# Patient Record
Sex: MALE | Race: WHITE | NOT HISPANIC OR LATINO | Employment: PART TIME | URBAN - METROPOLITAN AREA
[De-identification: names, ages, dates, MRNs, and addresses within clinical notes are randomized per-mention and may not be internally consistent; named-entity substitution may affect disease eponyms.]

---
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
## 2019-01-07 ENCOUNTER — TELEPHONE (OUTPATIENT)
Dept: PEDIATRICS | Facility: CLINIC | Age: 19
End: 2019-01-07

## 2019-01-07 DIAGNOSIS — F90.2 ATTENTION DEFICIT HYPERACTIVITY DISORDER (ADHD), COMBINED TYPE: Primary | ICD-10-CM

## 2019-01-07 RX ORDER — METHYLPHENIDATE HYDROCHLORIDE 30 MG/1
30 CAPSULE, EXTENDED RELEASE ORAL 2 TIMES DAILY
Qty: 60 CAPSULE | Refills: 0 | Status: SHIPPED | OUTPATIENT
Start: 2019-01-07 | End: 2019-02-05 | Stop reason: DRUGHIGH

## 2019-01-07 NOTE — TELEPHONE ENCOUNTER
Ronald Chicas, calling to notify that Concerta CR isn't covered by the insurance, will cost about $501 for one month supply. Dad wants to know whether pt can try IR bid which will be around $30 per month.     Please advise. Need to leave the new rx with FD when ready & notify dad at [phone](OK to LM).    Notes from 7/19/18:  Attention deficit hyperactivity disorder (ADHD), combined type  Currently tolerating concerta dose well. No concerns, no heart racing or elevated BPs.   - methylphenidate ER (CONCERTA) 36 MG CR tablet; TAKE 2 DAILY  Dispense: 60 tablet; Refill: 0    methylphenidate ER (CONCERTA) 36 MG CR tablet 60 tablet 0 12/15/2018  --   Sig - Route: Take 2 tablets (72 mg) by mouth every morning - Oral     Felisa RN  Triage Nurse

## 2019-01-07 NOTE — TELEPHONE ENCOUNTER
Ok to try ritalin 30 mg BID. Discuss taking afternoon dose earlier in the afternoon to avoid keeping up at night. Routing to providers in clinic to sign if ok.     Patient due for follow up, MA team to help schedule patient.     Tyson Paulino MD

## 2019-01-08 NOTE — TELEPHONE ENCOUNTER
Rx placed at  area for . Please asst with scheduling appt. See provider not below.  Cristel VARGAS, ROSALIO,ROMEO

## 2019-01-17 ENCOUNTER — TELEPHONE (OUTPATIENT)
Dept: PEDIATRICS | Facility: CLINIC | Age: 19
End: 2019-01-17

## 2019-01-17 DIAGNOSIS — F90.2 ATTENTION DEFICIT HYPERACTIVITY DISORDER (ADHD), COMBINED TYPE: Primary | ICD-10-CM

## 2019-01-17 NOTE — TELEPHONE ENCOUNTER
Reason for Call:  Other prescription    Detailed comments: The pt's father was calling and the rx for the methylphenidate (RITALIN LA) 30 MG 24 hr capsule was picked up today. When the pt's mother went to the pharmacy to fill the rx she was told that the medication wasn't covered and it needed a PA. She paid for the medication out of pocket instead of waiting for the PA to go through. So the pt has 15 days of his medication but the father is wondering if the pt can go back onto the Concerta 36mg ER with the same dosage as before. Because from what he recalls that was covered by insurance.     If possible please refill the new rx for the Concerta ER 36mg and leave the rx at the clinic . Please call the father with any questions and when the rx is ready for . Thank you.     Phone Number Patient can be reached at: Other phone number: [phone]    Best Time: Anytime    Can we leave a detailed message on this number? YES    Call taken on 1/17/2019 at 4:50 PM by Mey Ruiz

## 2019-01-18 ENCOUNTER — TELEPHONE (OUTPATIENT)
Dept: PEDIATRICS | Facility: CLINIC | Age: 19
End: 2019-01-18

## 2019-01-18 DIAGNOSIS — F90.2 ATTENTION DEFICIT HYPERACTIVITY DISORDER (ADHD), COMBINED TYPE: ICD-10-CM

## 2019-01-18 RX ORDER — METHYLPHENIDATE HYDROCHLORIDE 36 MG/1
36 TABLET ORAL EVERY MORNING
Qty: 30 TABLET | Refills: 0 | Status: SHIPPED | OUTPATIENT
Start: 2019-01-18 | End: 2019-02-05 | Stop reason: DRUGHIGH

## 2019-01-18 NOTE — TELEPHONE ENCOUNTER
The father is aware that the rx has been filled. I have walked the rx down to the .   Mey Ruiz on 1/18/2019 at 2:24 PM

## 2019-01-18 NOTE — TELEPHONE ENCOUNTER
Prior Authorization Retail Medication Request    Medication/Dose: methylphenidate (RITALIN LA) 30 MG 24 hr capsule  ICD code (if different than what is on RX):  Attention deficit hyperactivity disorder (ADHD), combined type [F90.2]  Previously Tried and Failed:  N/A  Rationale:  N/A     Insurance Name:  [facility]   Insurance ID:  [id number]      Pharmacy Information (if different than what is on RX)  Name:  [facility]   Phone:  [phone]

## 2019-01-22 DIAGNOSIS — F90.2 ATTENTION DEFICIT HYPERACTIVITY DISORDER (ADHD), COMBINED TYPE: ICD-10-CM

## 2019-01-22 RX ORDER — METHYLPHENIDATE HYDROCHLORIDE 36 MG/1
72 TABLET ORAL EVERY MORNING
Qty: 60 TABLET | Refills: 0 | Status: SHIPPED | OUTPATIENT
Start: 2019-01-22 | End: 2019-02-05

## 2019-01-22 NOTE — TELEPHONE ENCOUNTER
Mey MARIA placed Rx at  on first floor. Parent's notified of new Rx and ready to be picked up.  Chantel Linn MA 1/22/2019 4:35 PM

## 2019-01-22 NOTE — TELEPHONE ENCOUNTER
Patient father calling to report the wrong dose of concerta was reordered for patient.     Reports tried ritalin and then when concerta was ordered was only ordered for 36 mg once daily.  Notes that patient was previously on 36 mg Take 2 daily.    Previous BID order pended for provider to review.    Patient father will  down stairs.  Notes that he is aware will need a PA.    Please review pended order  Esperanza CHONG RN - Triage  [facility]

## 2019-01-23 NOTE — TELEPHONE ENCOUNTER
PA Initiation    Medication: methylphenidate (RITALIN LA) 30 MG 24 hr capsule - INITIATED  Insurance Company: [employer] [place] - Phone [phone] Fax [phone]  Pharmacy Filling the Rx: [email or web address] [address] [place] [address] AT [facility]  Filling Pharmacy Phone: [phone]  Filling Pharmacy Fax:    Start Date: 1/23/2019

## 2019-01-24 NOTE — TELEPHONE ENCOUNTER
PRIOR AUTHORIZATION DENIED    Medication: methylphenidate (RITALIN LA) 30 MG 24 hr capsule - DENIED    Denial Date: 1/24/2019    Denial Rational: Dose being prescribed can be reached with a lower quantity of a higher strength. For example, one 60mg capsule instead of two 30mg capsules. Up to one capsule per day.    Appeal Information:

## 2019-01-25 RX ORDER — METHYLPHENIDATE HYDROCHLORIDE 60 MG/1
1 CAPSULE, EXTENDED RELEASE ORAL DAILY
Qty: 30 CAPSULE | Refills: 0 | Status: SHIPPED | OUTPATIENT
Start: 2019-01-25 | End: 2019-02-05

## 2019-02-04 NOTE — PROGRESS NOTES
SUBJECTIVE:   Chino Jesus is a 18 year old male who presents to clinic today for the following health issues:      Medication Followup of Concerta     Taking Medication as prescribed: yes    Side Effects:  Feels drowsy during the day     Medication Helping Symptoms:  yes     Feels that this has been working well, switched to this due to cost, feels doing ok. Possibly some increased headaches, sleeping ok, mood doing ok and would like to stay on 200 mg of zoloft as feels helps with anxiety. Has been less physically active. Doing some special Olympics sports. No palpitations, no chest pain or shortness of breath. Has been biting fingernails more, anxious about transitioning and wondering about seeing psychology for this as has in the past.    Mole: has been scratching off at times, did look darker before, was slightly white at times.     Problem list and histories reviewed & adjusted, as indicated.  Additional history: as documented    Patient Active Problem List   Diagnosis     Anxiety state     ADHD (attention deficit hyperactivity disorder)     Obsessive-compulsive disorder, unspecified type     Active autistic disorder     Mood disorder (H)     Past Surgical History:   Procedure Laterality Date     CIRCUMCISION N/A 8/5/2015    Procedure: CIRCUMCISION;  Surgeon: Chapin Guzman MD;  Location:  OR       Social History     Tobacco Use     Smoking status: Never Smoker     Smokeless tobacco: Never Used   Substance Use Topics     Alcohol use: No     Family History   Adopted: Yes   Problem Relation Age of Onset     Family History Negative Mother            Reviewed and updated as needed this visit by clinical staff       Reviewed and updated as needed this visit by Provider         ROS:  Constitutional, HEENT, cardiovascular, pulmonary, GI, , musculoskeletal, neuro, skin, endocrine and psych systems are negative, except as otherwise noted.    OBJECTIVE:     /68 (BP Location: Right arm, Cuff  "Size: Adult Regular)   Pulse 94   Temp 99.5  F (37.5  C) (Oral)   Ht 1.6 m (5' 3\")   Wt 63 kg (139 lb)   SpO2 96%   BMI 24.62 kg/m    Body mass index is 24.62 kg/m .  GENERAL: healthy, alert and no distress  EYES: Eyes grossly normal to inspection, PERRL and conjunctivae and sclerae normal  NECK: no adenopathy, no asymmetry, masses, or scars and thyroid normal to palpation  RESP: lungs clear to auscultation - no rales, rhonchi or wheezes  CV: regular rate and rhythm, normal S1 S2, no S3 or S4, no murmur, click or rub, no peripheral edema and peripheral pulses strong  ABDOMEN: soft, nontender, no hepatosplenomegaly, no masses and bowel sounds normal  MS: no gross musculoskeletal defects noted, no edema  SKIN: no suspicious lesions or rashes  NEURO: Normal strength and tone, mentation intact and speech normal  PSYCH: mentation appears normal, affect normal/bright    Diagnostic Test Results:  Results for orders placed or performed in visit on 01/23/18   Strep, Rapid Screen   Result Value Ref Range    Specimen Description Throat     Rapid Strep A Screen       NEGATIVE: No Group A streptococcal antigen detected by immunoassay, await culture report.   Beta strep group A culture   Result Value Ref Range    Specimen Description Throat     Culture Micro No beta hemolytic Streptococcus Group A isolated        ASSESSMENT/PLAN:       ICD-10-CM    1. Attention deficit hyperactivity disorder (ADHD), combined type F90.2 methylphenidate (CONCERTA) 36 MG CR tablet     MENTAL HEALTH REFERRAL  - Child/Adolescent; Outpatient Treatment; Individual/Couples/Family/Group Therapy; Other: [facility] [phone]; We will contact you to schedule the appointment or please call with any questions     DISCONTINUED: methylphenidate (CONCERTA) 36 MG CR tablet     DISCONTINUED: methylphenidate (CONCERTA) 36 MG CR tablet   2. Anxiety F41.9 sertraline (ZOLOFT) 100 MG tablet     MENTAL HEALTH REFERRAL  - Child/Adolescent; " Outpatient Treatment; Individual/Couples/Family/Group Therapy; Other: [facility] [phone]; We will contact you to schedule the appointment or please call with any questions   3. Mood disorder (H) F39 sertraline (ZOLOFT) 100 MG tablet     Patient Instructions   1) They should call to set up with psychology    2) Continue the same dose of the concerta    3) Continue the zoloft at 200 mg per day.    MD Tyson Ruano MD, MD  [facility] [place]

## 2019-02-05 ENCOUNTER — OFFICE VISIT (OUTPATIENT)
Dept: PEDIATRICS | Facility: CLINIC | Age: 19
End: 2019-02-05
Payer: COMMERCIAL

## 2019-02-05 VITALS
HEART RATE: 94 BPM | SYSTOLIC BLOOD PRESSURE: 120 MMHG | WEIGHT: 139 LBS | TEMPERATURE: 99.5 F | DIASTOLIC BLOOD PRESSURE: 68 MMHG | OXYGEN SATURATION: 96 % | BODY MASS INDEX: 24.63 KG/M2 | HEIGHT: 63 IN

## 2019-02-05 DIAGNOSIS — F41.9 ANXIETY: ICD-10-CM

## 2019-02-05 DIAGNOSIS — F90.2 ATTENTION DEFICIT HYPERACTIVITY DISORDER (ADHD), COMBINED TYPE: ICD-10-CM

## 2019-02-05 DIAGNOSIS — F39 MOOD DISORDER (H): ICD-10-CM

## 2019-02-05 PROCEDURE — 99214 OFFICE O/P EST MOD 30 MIN: CPT | Performed by: INTERNAL MEDICINE

## 2019-02-05 RX ORDER — SERTRALINE HYDROCHLORIDE 100 MG/1
200 TABLET, FILM COATED ORAL DAILY
Qty: 180 TABLET | Refills: 3 | Status: SHIPPED | OUTPATIENT
Start: 2019-02-05 | End: 2019-12-19

## 2019-02-05 RX ORDER — METHYLPHENIDATE HYDROCHLORIDE 36 MG/1
72 TABLET ORAL EVERY MORNING
Qty: 60 TABLET | Refills: 0 | Status: SHIPPED | OUTPATIENT
Start: 2019-02-05 | End: 2019-02-05

## 2019-02-05 RX ORDER — METHYLPHENIDATE HYDROCHLORIDE 36 MG/1
72 TABLET ORAL EVERY MORNING
Qty: 60 TABLET | Refills: 0 | Status: SHIPPED | OUTPATIENT
Start: 2019-03-07 | End: 2019-02-05

## 2019-02-05 RX ORDER — LANOLIN ALCOHOL/MO/W.PET/CERES
10 CREAM (GRAM) TOPICAL
COMMUNITY

## 2019-02-05 RX ORDER — METHYLPHENIDATE HYDROCHLORIDE 36 MG/1
72 TABLET ORAL EVERY MORNING
Qty: 60 TABLET | Refills: 0 | Status: SHIPPED | OUTPATIENT
Start: 2019-04-06 | End: 2019-05-23

## 2019-02-05 ASSESSMENT — MIFFLIN-ST. JEOR: SCORE: 1540.63

## 2019-02-05 NOTE — PATIENT INSTRUCTIONS
1) They should call to set up with psychology    2) Continue the same dose of the concerta    3) Continue the zoloft at 200 mg per day.    Tyson Paulino MD

## 2019-03-15 ENCOUNTER — TRANSFERRED RECORDS (OUTPATIENT)
Dept: HEALTH INFORMATION MANAGEMENT | Facility: CLINIC | Age: 19
End: 2019-03-15

## 2019-05-20 DIAGNOSIS — F90.2 ATTENTION DEFICIT HYPERACTIVITY DISORDER (ADHD), COMBINED TYPE: ICD-10-CM

## 2019-05-20 NOTE — TELEPHONE ENCOUNTER
Patient's mother called wondering if Dr. Paulino would prescribe 3 rx for 3 months of the Concerta (like he did in the last OV the patient had) or if the patient would need an office visit. Mother stated that patient has about 7 days left of the rx. Please call back at [phone].

## 2019-05-20 NOTE — TELEPHONE ENCOUNTER
Scripts can be e-scribe.  No pharmacy pended.  Left message to return call.  Need to know what pharmacy to send scripts to  Esperanza CHONG RN - Triage  [facility]

## 2019-05-20 NOTE — TELEPHONE ENCOUNTER
Return call from mom - pended pharmacy - discuss ed 72 hours for processing refills please and new electronic process for controlled substances - agrees with plan - consent on file

## 2019-05-21 NOTE — TELEPHONE ENCOUNTER
Routing refill request to provider for review/approval because:  Drug not on the [facility] refill protocol   Agueda Boyce RN

## 2019-05-23 RX ORDER — METHYLPHENIDATE HYDROCHLORIDE 36 MG/1
72 TABLET ORAL EVERY MORNING
Qty: 60 TABLET | Refills: 0 | Status: SHIPPED | OUTPATIENT
Start: 2019-05-23 | End: 2019-06-25

## 2019-06-24 ENCOUNTER — TELEPHONE (OUTPATIENT)
Dept: PEDIATRICS | Facility: CLINIC | Age: 19
End: 2019-06-24

## 2019-06-24 DIAGNOSIS — F90.2 ATTENTION DEFICIT HYPERACTIVITY DISORDER (ADHD), COMBINED TYPE: ICD-10-CM

## 2019-06-24 NOTE — TELEPHONE ENCOUNTER
Requested Prescriptions   Pending Prescriptions Disp Refills     methylphenidate (CONCERTA) 36 MG CR tablet        Last Written Prescription Date:  5/23/2019  Last Fill Quantity: 60,   # refills: 0  Last Office Visit: 2/5/2019  Future Office visit:       Routing refill request to provider for review/approval because:  Drug not on the [facility], P or [facility] refill protocol or controlled substance   60 tablet 0     Sig: Take 2 tablets (72 mg) by mouth every morning TAKE 2 DAILY       There is no refill protocol information for this order

## 2019-06-24 NOTE — TELEPHONE ENCOUNTER
RX [place] ([place] reviewed:  reviewed- no concerns    Esperanza CHONG RN - Triage  [facility]

## 2019-06-25 RX ORDER — METHYLPHENIDATE HYDROCHLORIDE 36 MG/1
72 TABLET ORAL EVERY MORNING
Qty: 60 TABLET | Refills: 0 | Status: SHIPPED | OUTPATIENT
Start: 2019-06-25 | End: 2019-07-23

## 2019-07-03 ENCOUNTER — OFFICE VISIT (OUTPATIENT)
Dept: PEDIATRICS | Facility: CLINIC | Age: 19
End: 2019-07-03
Payer: COMMERCIAL

## 2019-07-03 VITALS
OXYGEN SATURATION: 97 % | TEMPERATURE: 97.5 F | HEART RATE: 60 BPM | WEIGHT: 139 LBS | BODY MASS INDEX: 24.62 KG/M2 | DIASTOLIC BLOOD PRESSURE: 78 MMHG | SYSTOLIC BLOOD PRESSURE: 102 MMHG

## 2019-07-03 DIAGNOSIS — S41.151A DOG BITE OF ARM, RIGHT, INITIAL ENCOUNTER: Primary | ICD-10-CM
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
## 2019-07-03 PROCEDURE — 99213 OFFICE O/P EST LOW 20 MIN: CPT | Mod: 25 | Performed by: NURSE PRACTITIONER

## 2019-07-03 PROCEDURE — 90714 TD VACC NO PRESV 7 YRS+ IM: CPT | Performed by: NURSE PRACTITIONER

## 2019-07-03 PROCEDURE — 90471 IMMUNIZATION ADMIN: CPT | Performed by: NURSE PRACTITIONER

## 2019-07-03 NOTE — PROGRESS NOTES
"Subjective     Chino Jesus is a 18 year old male who presents to clinic today for the following health issues:    HPI   Concern - Dog bite on right forearm  Onset: Yesterday at 7PM.    Description:   Cut in right forearm.     Intensity: mild    Progression of Symptoms:  improving    Accompanying Signs & Symptoms:  NA    Previous history of similar problem:   NA    Precipitating factors:   Worsened by: Arm movement.     Alleviating factors:  Improved by:     Therapies Tried and outcome: Cleaned with alcohol and applied neosporin.     Family dog- up to date on rabies vaccine (due Oct 2019).  Pt suddenly went up to dog while eating and touched his face, resulting in a bite to the right forearm.  Initially bled \"quite a bit\" but has since resolved and has scabbed.  Slightly painful to move his arm.  Mom did give x2 doses of Augmentin from home.  He otherwise feels well without fever.    Reviewed and updated as needed this visit by Provider  Allergies  Meds       Review of Systems   Otherwise ROS is negative except as stated above.        Objective    /78   Pulse 60   Temp 97.5  F (36.4  C) (Tympanic)   Wt 63 kg (139 lb)   SpO2 97%   BMI 24.62 kg/m    Body mass index is 24.62 kg/m .  Physical Exam   GENERAL: healthy, alert and no distress  MS: no gross musculoskeletal defects noted, no edema  SKIN: x1 puncture wound with scab to upper right forearm, slight localized edema, tenderness, swelling, and light erythema surrounding wound    Diagnostic Test Results:  Labs reviewed in Epic        Assessment & Plan   1. Dog bite of arm, right, initial encounter  Start antibiotics. Will end up having 6 days total of Augmentin since Mom has given him x2 doses previously. Discussed signs/symptoms to watch for and when to return to clinic. Update tetanus vaccine.  - amoxicillin-clavulanate (AUGMENTIN) 875-125 MG tablet; Take 1 tablet by mouth 2 times daily for 5 days  Dispense: 10 tablet; Refill: 0  - TD PRESERV " FREE, IM (7+ YRS)  - ADMIN 1st VACCINE     See Patient Instructions    Return in about 2 days (around 7/5/2019) for Follow-up if symptoms do not improve or worsen.    Sharla Roberts NP  [facility]    Screening Questionnaire for Adult Immunization    Are you sick today?   No   Do you have allergies to medications, food, a vaccine component or latex?   No   Have you ever had a serious reaction after receiving a vaccination?   No   Do you have a long-term health problem with heart disease, lung disease, asthma, kidney disease, metabolic disease (e.g. diabetes), anemia, or other blood disorder?   No   Do you have cancer, leukemia, HIV/AIDS, or any other immune system problem?   No   In the past 3 months, have you taken medications that affect  your immune system, such as prednisone, other steroids, or anticancer drugs; drugs for the treatment of rheumatoid arthritis, Crohn s disease, or psoriasis; or have you had radiation treatments?   No   Have you had a seizure, or a brain or other nervous system problem?   No   During the past year, have you received a transfusion of blood or blood     products, or been given immune (gamma) globulin or antiviral drug?   No   For women: Are you pregnant or is there a chance you could become        pregnant during the next month?   No   Have you received any vaccinations in the past 4 weeks?   No     Immunization questionnaire answers were all negative.      Per orders of Sharla Roberts NP, injection of TD given by Rupa Alicia. Patient instructed to remain in clinic for 15 minutes afterwards, and to report any adverse reaction to me immediately.       Screening performed by Rupa Alicia on 7/3/2019 at 6:10 PM.

## 2019-07-03 NOTE — PATIENT INSTRUCTIONS
Patient Education     Animal Bite (General)  An animal bite can cause a wound deep enough to break the skin. In such cases, the wound is cleaned and then sometimes closed. If the wound is closed, it may not be closed completely. This is so that fluid can drain if the wound becomes infected. In addition to wound care, a tetanus shot may be given, if needed.    Home care    Care for the wound as directed. If a dressing was applied to the wound, be sure to change it as directed.    Wash your hands well with soap and warm water before and after caring for the wound. This helps lower the risk of infection.    If the wound bleeds, place a clean, soft cloth on the wound. Then firmly apply pressure until the bleeding stops. This may take up to 5 minutes. Do not release the pressure and look at the wound during this time.    Most skin wounds heal within 10 days. But an infection can occur even with proper treatment. So be sure to watch the wound for signs of infection (see below). Check the wound as often as directed by your healthcare provider.    Antibiotics may be prescribed. These help prevent or treat infection. If you re given antibiotics, take them as directed. Also be sure to complete the medicines.  Rabies prevention  Rabies is a virus that can be carried in certain animals. These can include domestic animals such as dogs and cats. Wild animals such as skunks, raccoons, foxes, and bats can also carry rabies. Pets fully vaccinated against rabies (2 shots) are at very low risk of infection. But because human rabies is almost always fatal, any biting pet should be confined for 10 days as an extra precaution. In general, if there is a risk for rabies, the following steps may need to be taken:    If someone s pet dog or cat has bitten you, it should be kept in a secure area for the next 10 days to watch for signs of illness. (If the pet owner won t allow this, contact your local animal control center.) If the dog or cat  becomes ill or dies during that time, contact your local animal control center at once so the animal may be tested for rabies. If the pet stays healthy for the next 10 days, there is no danger of rabies in the animal or you.    If a stray pet bit you, contact your local animal control center. They can give information on capture, quarantine, and animal rabies testing.    If you can t find the animal that bit you in the next 2 days, and if rabies exists in your region, you may need to receive the rabies vaccine series. Call your healthcare provider right away. Or return to the emergency department promptly.    All animal bites should be reported to the local animal control center. If you were not given a form to fill out, you can report this yourself.  Follow-up care  Follow up with your healthcare provider, or as directed.  When to seek medical advice  Call your healthcare provider right away if any of these occur:    Signs of infection:  ? Spreading redness or warmth from the wound  ? Increased pain or swelling  ? Fever of 100.4 F (38 C) or higher, or as directed by your healthcare provider  ? Colored fluid or pus draining from the wound    Signs of rabies infection:  ? Headache  ? Confusion  ? Strange behavior  ? Increased salivating and drooling  ? Seizure    Decreased ability to move any body part near the bite area    Bleeding that can't be stopped after 5 minutes of firm pressure  Date Last Reviewed: 3/1/2017    0609-8536 [employer]. [address] [place] [address]. All rights reserved. This information is not intended as a substitute for professional medical care. Always follow your healthcare professional's instructions.

## 2019-07-09 ENCOUNTER — OFFICE VISIT (OUTPATIENT)
Dept: URGENT CARE | Facility: URGENT CARE | Age: 19
End: 2019-07-09
Payer: COMMERCIAL

## 2019-07-09 VITALS
DIASTOLIC BLOOD PRESSURE: 72 MMHG | TEMPERATURE: 97.8 F | OXYGEN SATURATION: 98 % | WEIGHT: 138 LBS | HEART RATE: 74 BPM | RESPIRATION RATE: 18 BRPM | BODY MASS INDEX: 24.45 KG/M2 | SYSTOLIC BLOOD PRESSURE: 104 MMHG

## 2019-07-09 DIAGNOSIS — K12.0 CANKER SORE: Primary | ICD-10-CM

## 2019-07-09 PROCEDURE — 99213 OFFICE O/P EST LOW 20 MIN: CPT | Performed by: PHYSICIAN ASSISTANT

## 2019-07-09 NOTE — PROGRESS NOTES
SUBJECTIVE:  Chino Jesus is a 18 year old male who presents to the clinic today for a pain in mouth.  Onset of rash was 1 day(s) ago.   Rash is sudden onset.  Location of the rash: mouth.  Quality/symptoms of rash: painful   Symptoms are moderate and rash seems to be stable.  Previous history of a similar rash? No  Recent exposure history: none known    Associated symptoms include: nothing.    Past Medical History:   Diagnosis Date     ADHD (attention deficit hyperactivity disorder)      Anxiety      OCD (obsessive compulsive disorder)      PDD (pervasive developmental disorder)      Short stature      Current Outpatient Medications   Medication Sig Dispense Refill     melatonin 3 MG tablet Take 3 mg by mouth nightly as needed for sleep (patient takes two tablets)       methylphenidate (CONCERTA) 36 MG CR tablet Take 2 tablets (72 mg) by mouth every morning TAKE 2 DAILY 60 tablet 0     sertraline (ZOLOFT) 100 MG tablet Take 2 tablets (200 mg) by mouth daily 180 tablet 3     Social History     Tobacco Use     Smoking status: Never Smoker     Smokeless tobacco: Never Used   Substance Use Topics     Alcohol use: No       ROS:  10 point ROS negative except as listed above      EXAM:   /72   Pulse 74   Temp 97.8  F (36.6  C)   Resp 18   Wt 62.6 kg (138 lb)   SpO2 98%   BMI 24.45 kg/m    GENERAL: alert, no acute distress.  SKIN: canker sore at upper jaw angle  EYES: conjunctiva clear  RESP: no labored breathing  NEURO: Normal strength and tone, sensory exam grossly normal,  normal speech and mentation    ASSESSMENT:  (K12.0) Canker sore  (primary encounter diagnosis)  Plan:     Patient Instructions   Dry and paint on orajel      Patient Education     Canker Sore    A canker sore (also called an aphthous ulcer) is a painful sore on the lining of the mouth. It is most painful during the first few days, and it lasts about 7 to 14 days before going away.  Causes  Canker sores are not cold sores or fever  blisters. They are not contagious, so they are not spread by contact. The exact cause of canker sores is not clear, but there are a number of things that can trigger them in different people.    Mild injury, such as biting the inside of the mouth, lip, or cheek, or dental procedures    Stress    Poor diet, or lack of certain nutrients, including B vitamins and iron    Foods that can irritate the mouth, including tomatoes, citrus fruits, and some nuts (foods that are acidic or contain bitter substances called tannins)    Irritating chemicals, such as those in some toothpastes and mouthwashes    Certain chronic illnesses  Symptoms  Canker sores are found on the lining of the mouth. They can be inside the cheeks or lips, on the roof of the mouth, at the base of the gums, on the tongue, or in the back of the throat. Canker sores typically have these characteristics:    Small, flat (not raised) sores    Can be white or yellowish bumps that are red around the edges or have a red halo    Usually small in size, roundish, and in groups    Accompanied by pain or burning  Canker sores don't leave a scar. But they usually come back.  Home care  The goals of canker sore treatment are to decrease the pain, speed healing, and prevent sores from coming back. No single treatment works for everyone. Try a number of methods to see what works best.  General care    You may find that soft, easy-to-chew foods cause less pain. Use a straw to direct liquids away from the sore.    Use a soft-bristle toothbrush, and brush your teeth gently.    Don t eat acidic, salty, or spicy foods.    Don t eat crusty or crunchy foods such as [place] bread or potato chips. These kinds of foods can hurt the inside of your mouth, or scrape your existing canker sores.  Medicines  You can try over-the-counter medicines that cover the sores and numb them. This protects the sores while they heal and helps reduce pain.  Homemade rinses and solutions  You can use  these solutions as mouth rinses. Spit them out after using them. You can also dab them on the sores. You can repeat these treatments as often as needed.    Rinse your mouth with saltwater.    Mix equal amounts of hydrogen peroxide and water. You can use this as a mouthwash or dab it on spots with a cotton swab. You can also add sodium bicarbonate to this to make a paste, and then dab it on spots.  Follow-up care  Follow up with your healthcare provider, or as advised.    If a culture was done, you will be notified if the treatment needs to be changed. You can call as directed for the results.  Call 911  Call 911 if any of these occur:    Trouble breathing    Inability to swallow    Extreme drowsiness or trouble waking up    Fainting or loss of consciousness    Rapid heart rate    Seizure    Stiff neck  When to seek medical advice  Call your healthcare provider right away if any of these occur:    You have a fever of 100.4 F (38 C) or higher, or as directed by your provider    You are pregnant    You just had surgery or another medical procedure, or you were just discharged from the hospital    It's too painful to eat or swallow  Date Last Reviewed: 10/1/2017    4131-4407 [employer]. [address] [place] [address], [place] [address]. All rights reserved. This information is not intended as a substitute for professional medical care. Always follow your healthcare professional's instructions.

## 2019-07-09 NOTE — PATIENT INSTRUCTIONS
Dry and paint on orajel      Patient Education     Canker Sore    A canker sore (also called an aphthous ulcer) is a painful sore on the lining of the mouth. It is most painful during the first few days, and it lasts about 7 to 14 days before going away.  Causes  Canker sores are not cold sores or fever blisters. They are not contagious, so they are not spread by contact. The exact cause of canker sores is not clear, but there are a number of things that can trigger them in different people.    Mild injury, such as biting the inside of the mouth, lip, or cheek, or dental procedures    Stress    Poor diet, or lack of certain nutrients, including B vitamins and iron    Foods that can irritate the mouth, including tomatoes, citrus fruits, and some nuts (foods that are acidic or contain bitter substances called tannins)    Irritating chemicals, such as those in some toothpastes and mouthwashes    Certain chronic illnesses  Symptoms  Canker sores are found on the lining of the mouth. They can be inside the cheeks or lips, on the roof of the mouth, at the base of the gums, on the tongue, or in the back of the throat. Canker sores typically have these characteristics:    Small, flat (not raised) sores    Can be white or yellowish bumps that are red around the edges or have a red halo    Usually small in size, roundish, and in groups    Accompanied by pain or burning  Canker sores don't leave a scar. But they usually come back.  Home care  The goals of canker sore treatment are to decrease the pain, speed healing, and prevent sores from coming back. No single treatment works for everyone. Try a number of methods to see what works best.  General care    You may find that soft, easy-to-chew foods cause less pain. Use a straw to direct liquids away from the sore.    Use a soft-bristle toothbrush, and brush your teeth gently.    Don t eat acidic, salty, or spicy foods.    Don t eat crusty or crunchy foods such as [place] bread  or potato chips. These kinds of foods can hurt the inside of your mouth, or scrape your existing canker sores.  Medicines  You can try over-the-counter medicines that cover the sores and numb them. This protects the sores while they heal and helps reduce pain.  Homemade rinses and solutions  You can use these solutions as mouth rinses. Spit them out after using them. You can also dab them on the sores. You can repeat these treatments as often as needed.    Rinse your mouth with saltwater.    Mix equal amounts of hydrogen peroxide and water. You can use this as a mouthwash or dab it on spots with a cotton swab. You can also add sodium bicarbonate to this to make a paste, and then dab it on spots.  Follow-up care  Follow up with your healthcare provider, or as advised.    If a culture was done, you will be notified if the treatment needs to be changed. You can call as directed for the results.  Call 911  Call 911 if any of these occur:    Trouble breathing    Inability to swallow    Extreme drowsiness or trouble waking up    Fainting or loss of consciousness    Rapid heart rate    Seizure    Stiff neck  When to seek medical advice  Call your healthcare provider right away if any of these occur:    You have a fever of 100.4 F (38 C) or higher, or as directed by your provider    You are pregnant    You just had surgery or another medical procedure, or you were just discharged from the hospital    It's too painful to eat or swallow  Date Last Reviewed: 10/1/2017    5484-6575 [employer]. [address] [place] [address], [place] [address]. All rights reserved. This information is not intended as a substitute for professional medical care. Always follow your healthcare professional's instructions.

## 2019-07-23 DIAGNOSIS — F90.2 ATTENTION DEFICIT HYPERACTIVITY DISORDER (ADHD), COMBINED TYPE: ICD-10-CM

## 2019-07-23 RX ORDER — METHYLPHENIDATE HYDROCHLORIDE 36 MG/1
72 TABLET ORAL EVERY MORNING
Qty: 60 TABLET | Refills: 0 | Status: CANCELLED | OUTPATIENT
Start: 2019-07-23

## 2019-07-23 RX ORDER — METHYLPHENIDATE HYDROCHLORIDE 36 MG/1
72 TABLET ORAL EVERY MORNING
Qty: 60 TABLET | Refills: 0 | Status: SHIPPED | OUTPATIENT
Start: 2019-07-23 | End: 2019-12-19

## 2019-07-23 NOTE — TELEPHONE ENCOUNTER
Requesting refill of Concerta for Chino.  [facility] still [facility].  Please call Mom, Ani Mcqueen, guardian also, at [phone], with questions and/or when complete.  Pt has appt 8/2/2019 with Dr Paulino.  Thank you.  jaun

## 2019-07-23 NOTE — TELEPHONE ENCOUNTER
Requesting refill of Concerta for Chino.  Ph[facility] still [facility].  Please call Mom, Ani Mcqueen, guardian also, at [phone], with questions and/or when complete.  Pt has appt 8/2/2019 with Dr Paulino.  Thank you.  jaun

## 2019-07-23 NOTE — TELEPHONE ENCOUNTER
Refill request for the following:    methylphenidate (CONCERTA) 36 MG CR tablets  Take two tabs every morning    RX monitoring [place] ([place]) reviewed:  reviewed- no concerns    [place]:  [email or web address]    Viktoriya Craig RN BSN   [facility]

## 2019-08-02 ENCOUNTER — OFFICE VISIT (OUTPATIENT)
Dept: PEDIATRICS | Facility: CLINIC | Age: 19
End: 2019-08-02
Payer: COMMERCIAL

## 2019-08-02 VITALS
SYSTOLIC BLOOD PRESSURE: 124 MMHG | TEMPERATURE: 98.7 F | DIASTOLIC BLOOD PRESSURE: 76 MMHG | HEIGHT: 63 IN | WEIGHT: 141.3 LBS | BODY MASS INDEX: 25.04 KG/M2 | OXYGEN SATURATION: 100 % | HEART RATE: 78 BPM

## 2019-08-02 DIAGNOSIS — F90.2 ATTENTION DEFICIT HYPERACTIVITY DISORDER (ADHD), COMBINED TYPE: Primary | ICD-10-CM

## 2019-08-02 PROCEDURE — 99214 OFFICE O/P EST MOD 30 MIN: CPT | Performed by: INTERNAL MEDICINE

## 2019-08-02 RX ORDER — METHYLPHENIDATE HYDROCHLORIDE 54 MG/1
54 TABLET ORAL DAILY
Qty: 30 TABLET | Refills: 0 | Status: SHIPPED | OUTPATIENT
Start: 2019-08-02 | End: 2019-09-27

## 2019-08-02 RX ORDER — METHYLPHENIDATE HYDROCHLORIDE 54 MG/1
54 TABLET ORAL DAILY
Qty: 30 TABLET | Refills: 0 | Status: SHIPPED | OUTPATIENT
Start: 2019-10-03 | End: 2019-09-27

## 2019-08-02 RX ORDER — METHYLPHENIDATE HYDROCHLORIDE 54 MG/1
54 TABLET ORAL DAILY
Qty: 30 TABLET | Refills: 0 | Status: SHIPPED | OUTPATIENT
Start: 2019-09-02 | End: 2019-09-27

## 2019-08-02 ASSESSMENT — ANXIETY QUESTIONNAIRES
GAD7 TOTAL SCORE: 2
6. BECOMING EASILY ANNOYED OR IRRITABLE: NOT AT ALL
2. NOT BEING ABLE TO STOP OR CONTROL WORRYING: SEVERAL DAYS
5. BEING SO RESTLESS THAT IT IS HARD TO SIT STILL: NOT AT ALL
1. FEELING NERVOUS, ANXIOUS, OR ON EDGE: NOT AT ALL
7. FEELING AFRAID AS IF SOMETHING AWFUL MIGHT HAPPEN: NOT AT ALL
3. WORRYING TOO MUCH ABOUT DIFFERENT THINGS: NOT AT ALL
IF YOU CHECKED OFF ANY PROBLEMS ON THIS QUESTIONNAIRE, HOW DIFFICULT HAVE THESE PROBLEMS MADE IT FOR YOU TO DO YOUR WORK, TAKE CARE OF THINGS AT HOME, OR GET ALONG WITH OTHER PEOPLE: SOMEWHAT DIFFICULT

## 2019-08-02 ASSESSMENT — MIFFLIN-ST. JEOR: SCORE: 1551.06

## 2019-08-02 ASSESSMENT — PATIENT HEALTH QUESTIONNAIRE - PHQ9
SUM OF ALL RESPONSES TO PHQ QUESTIONS 1-9: 2
5. POOR APPETITE OR OVEREATING: SEVERAL DAYS

## 2019-08-02 NOTE — PROGRESS NOTES
Subjective     Chino Jesus is a 18 year old male who presents to clinic today for the following health issues:    HPI     Anxiety Follow-Up    How are you doing with your anxiety since your last visit? No change    Are you having other symptoms that might be associated with anxiety? No    Have you had a significant life event? No     Are you feeling depressed? No    Do you have any concerns with your use of alcohol or other drugs? No    Social History     Tobacco Use     Smoking status: Never Smoker     Smokeless tobacco: Never Used   Substance Use Topics     Alcohol use: No     Drug use: No     Feels that getting more fatigued on the concerta, on lower doses had   - was on 54 mg in the past, and felt that this could be an option to try.   - can get symptoms of increased volume of voice and issues with concentration  - has been having these symptoms ongoing at the 72 mg and does not like.   - sleep has been fairly good recently  - feels that anxiety is doing well on the sertraline   - no chest pain or shortness of breath or headaches  - notes clear correlation between higher dose and fatigue.     - adderall in the past with gazed over type symptoms.     No flowsheet data found.  No flowsheet data found.  No flowsheet data found.    Feels that zoloft is working well at current dosing. No Si or HI, working well for anxiety.      Amount of exercise or physical activity: 4-5 days/week for an average of 45-60 minutes    Problems taking medications regularly: No    Medication side effects: Patient states feeling tired when taking Concerta     Diet: regular (no restrictions)          Patient Active Problem List   Diagnosis     Anxiety state     ADHD (attention deficit hyperactivity disorder)     Obsessive-compulsive disorder, unspecified type     Active autistic disorder     Mood disorder (H)     Past Surgical History:   Procedure Laterality Date     CIRCUMCISION N/A 8/5/2015    Procedure: CIRCUMCISION;  Surgeon:  "Chapin Guzman MD;  Location:  OR       Social History     Tobacco Use     Smoking status: Never Smoker     Smokeless tobacco: Never Used   Substance Use Topics     Alcohol use: No     Family History   Adopted: Yes   Problem Relation Age of Onset     Family History Negative Mother              Reviewed and updated as needed this visit by Provider         Review of Systems   ROS COMP: Constitutional, HEENT, cardiovascular, pulmonary, GI, , musculoskeletal, neuro, skin, endocrine and psych systems are negative, except as otherwise noted.      Objective    /76 (BP Location: Right arm, Patient Position: Sitting, Cuff Size: Adult Regular)   Pulse 78   Temp 98.7  F (37.1  C) (Oral)   Ht 1.6 m (5' 3\")   Wt 64.1 kg (141 lb 4.8 oz)   SpO2 100%   BMI 25.03 kg/m    Body mass index is 25.03 kg/m .  Physical Exam   GENERAL: healthy, alert and no distress  EYES: Eyes grossly normal to inspection, PERRL and conjunctivae and sclerae normal  NECK: no adenopathy, no asymmetry, masses, or scars and thyroid normal to palpation  RESP: lungs clear to auscultation - no rales, rhonchi or wheezes  CV: regular rate and rhythm, normal S1 S2, no S3 or S4, no murmur, click or rub, no peripheral edema and peripheral pulses strong  ABDOMEN: soft, nontender, no hepatosplenomegaly, no masses and bowel sounds normal  MS: no gross musculoskeletal defects noted, no edema  PSYCH: mentation at baseline, normal affect    Diagnostic Test Results:  Labs reviewed in Epic        Assessment & Plan       ICD-10-CM    1. Attention deficit hyperactivity disorder (ADHD), combined type F90.2 methylphenidate (CONCERTA) 54 MG CR tablet     methylphenidate (CONCERTA) 54 MG CR tablet     methylphenidate (CONCERTA) 54 MG CR tablet        BMI:   Estimated body mass index is 25.03 kg/m  as calculated from the following:    Height as of this encounter: 1.6 m (5' 3\").    Weight as of this encounter: 64.1 kg (141 lb 4.8 oz).       Patient Instructions "   1) Lets try the lower dose of the concerta at 54 mg per day    2) We can try other options if this is not helping we could consider vyvanse as next option    3) If we would decide to decrease the zoloft, would go down to 175 mg then several months later go down to 200 mg.    Tyson Paulino MD            See Patient Instructions    Return in about 6 months (around 2/2/2020).    Tyson Paulino MD  [facility]

## 2019-08-02 NOTE — PATIENT INSTRUCTIONS
1) Lets try the lower dose of the concerta at 54 mg per day    2) We can try other options if this is not helping we could consider vyvanse as next option    3) If we would decide to decrease the zoloft, would go down to 175 mg then several months later go down to 200 mg.    Tyson Paulino MD

## 2019-08-03 ASSESSMENT — ANXIETY QUESTIONNAIRES: GAD7 TOTAL SCORE: 2

## 2019-08-27 DIAGNOSIS — F90.2 ATTENTION DEFICIT HYPERACTIVITY DISORDER (ADHD), COMBINED TYPE: ICD-10-CM

## 2019-08-27 RX ORDER — METHYLPHENIDATE HYDROCHLORIDE 54 MG/1
54 TABLET ORAL DAILY
Qty: 30 TABLET | Refills: 0 | Status: CANCELLED | OUTPATIENT
Start: 2019-08-27

## 2019-08-27 NOTE — TELEPHONE ENCOUNTER
Requested Prescriptions   Pending Prescriptions Disp Refills     methylphenidate (CONCERTA) 54 MG CR tablet        Last Written Prescription Date:  7/23/2019  Last Fill Quantity: 60,   # refills: 0  Last Office Visit: 8/2/2019  Future Office visit:       Routing refill request to provider for review/approval because:  Drug not on the [facility], P or [facility] refill protocol or controlled substance   30 tablet 0     Sig: Take 1 tablet (54 mg) by mouth daily       There is no refill protocol information for this order

## 2019-08-30 NOTE — TELEPHONE ENCOUNTER
The pt would like this filled as soon as possible. Thank you.   Mey Ruiz on 8/30/2019 at 12:46 PM

## 2019-08-30 NOTE — TELEPHONE ENCOUNTER
Refill on file at pharmacy. Called patient and mother to inform them (DANILO on file).    Viktoriya Craig RN BSN   [facility]

## 2019-09-24 ENCOUNTER — PRE VISIT (OUTPATIENT)
Dept: PEDIATRICS | Facility: CLINIC | Age: 19
End: 2019-09-24

## 2019-09-24 NOTE — TELEPHONE ENCOUNTER
"Future Appointments   Date Time Provider Department Center   9/27/2019  8:50 AM Tyson Paulino MD [facility]     Appointment Notes for this encounter:   Data Unavailable    Health Maintenance Due   Topic Date Due     PREVENTIVE CARE VISIT  1999     ANNUAL REVIEW OF HM ORDERS  1999     INFLUENZA VACCINE (1) 09/01/2019       Pre-visit planning reviewed items:   Patient preferred phone number: [phone]   Patient contacted.     Actions completed:   Confirmed that the visit type and provider(s) are appropriate for the pt's reason for visit.  Confirmed date/time/location of appointment with patient.  Marked \"Pre-visit Planning Complete\" via Schedule activity.    "

## 2019-09-27 ENCOUNTER — ANCILLARY PROCEDURE (OUTPATIENT)
Dept: GENERAL RADIOLOGY | Facility: CLINIC | Age: 19
End: 2019-09-27
Attending: INTERNAL MEDICINE
Payer: COMMERCIAL

## 2019-09-27 ENCOUNTER — OFFICE VISIT (OUTPATIENT)
Dept: PEDIATRICS | Facility: CLINIC | Age: 19
End: 2019-09-27
Payer: COMMERCIAL

## 2019-09-27 VITALS
WEIGHT: 143 LBS | TEMPERATURE: 98.7 F | SYSTOLIC BLOOD PRESSURE: 122 MMHG | BODY MASS INDEX: 25.34 KG/M2 | DIASTOLIC BLOOD PRESSURE: 72 MMHG | OXYGEN SATURATION: 98 % | HEIGHT: 63 IN | HEART RATE: 85 BPM

## 2019-09-27 DIAGNOSIS — R30.0 DYSURIA: Primary | ICD-10-CM

## 2019-09-27 DIAGNOSIS — R10.84 ABDOMINAL PAIN, GENERALIZED: ICD-10-CM

## 2019-09-27 DIAGNOSIS — F90.2 ATTENTION DEFICIT HYPERACTIVITY DISORDER (ADHD), COMBINED TYPE: ICD-10-CM

## 2019-09-27 DIAGNOSIS — J31.0 CHRONIC RHINITIS: ICD-10-CM

## 2019-09-27 DIAGNOSIS — Z23 NEED FOR PROPHYLACTIC VACCINATION AND INOCULATION AGAINST INFLUENZA: ICD-10-CM

## 2019-09-27 LAB
ALBUMIN UR-MCNC: ABNORMAL MG/DL
APPEARANCE UR: CLEAR
BACTERIA #/AREA URNS HPF: ABNORMAL /HPF
BILIRUB UR QL STRIP: NEGATIVE
COLOR UR AUTO: YELLOW
GLUCOSE UR STRIP-MCNC: NEGATIVE MG/DL
HGB UR QL STRIP: ABNORMAL
KETONES UR STRIP-MCNC: NEGATIVE MG/DL
LEUKOCYTE ESTERASE UR QL STRIP: NEGATIVE
NITRATE UR QL: NEGATIVE
NON-SQ EPI CELLS #/AREA URNS LPF: ABNORMAL /LPF
PH UR STRIP: 6 PH (ref 5–7)
RBC #/AREA URNS AUTO: ABNORMAL /HPF
SOURCE: ABNORMAL
SP GR UR STRIP: 1.02 (ref 1–1.03)
UROBILINOGEN UR STRIP-ACNC: 0.2 EU/DL (ref 0.2–1)
WBC #/AREA URNS AUTO: ABNORMAL /HPF

## 2019-09-27 PROCEDURE — 74018 RADEX ABDOMEN 1 VIEW: CPT

## 2019-09-27 PROCEDURE — 90471 IMMUNIZATION ADMIN: CPT | Performed by: INTERNAL MEDICINE

## 2019-09-27 PROCEDURE — 81001 URINALYSIS AUTO W/SCOPE: CPT | Performed by: INTERNAL MEDICINE

## 2019-09-27 PROCEDURE — 99214 OFFICE O/P EST MOD 30 MIN: CPT | Mod: 25 | Performed by: INTERNAL MEDICINE

## 2019-09-27 PROCEDURE — 90686 IIV4 VACC NO PRSV 0.5 ML IM: CPT | Performed by: INTERNAL MEDICINE

## 2019-09-27 RX ORDER — METHYLPHENIDATE HYDROCHLORIDE 54 MG/1
54 TABLET ORAL DAILY
Qty: 30 TABLET | Refills: 0 | Status: SHIPPED | OUTPATIENT
Start: 2019-09-27 | End: 2020-05-27

## 2019-09-27 RX ORDER — FLUTICASONE PROPIONATE 50 MCG
2 SPRAY, SUSPENSION (ML) NASAL DAILY
Qty: 16 G | Refills: 3 | Status: SHIPPED | OUTPATIENT
Start: 2019-09-27 | End: 2023-09-14

## 2019-09-27 RX ORDER — METHYLPHENIDATE HYDROCHLORIDE 54 MG/1
54 TABLET ORAL DAILY
Qty: 30 TABLET | Refills: 0 | Status: SHIPPED | OUTPATIENT
Start: 2019-10-27 | End: 2020-10-26

## 2019-09-27 RX ORDER — METHYLPHENIDATE HYDROCHLORIDE 54 MG/1
54 TABLET ORAL DAILY
Qty: 30 TABLET | Refills: 0 | Status: SHIPPED | OUTPATIENT
Start: 2019-11-26 | End: 2021-02-03

## 2019-09-27 ASSESSMENT — MIFFLIN-ST. JEOR: SCORE: 1558.77

## 2019-09-27 NOTE — PROGRESS NOTES
Subjective     Chino Jesus is a 18 year old male who presents to clinic today for the following health issues:    HPI     Medication Followup     Taking Medication as prescribed: yes    Side Effects:  None    Medication Helping Symptoms:  yes     ADHD: feels better on the concerta 54 mg per day, feels going better with less side effects.    Abdominal Pain      Duration: several months     Description (location/character/radiation): around belly button, sharp pain        Associated flank pain: None    Intensity:  mild    Accompanying signs and symptoms:        Fever/Chills: no        Gas/Bloating: no        Nausea/vomitting: no        Diarrhea: no        Dysuria or Hematuria: no     History (previous similar pain/trauma/previous testing): none    Precipitating or alleviating factors:       Pain worse with eating/BM/urination: no       Pain relieved by BM: no     Therapies tried and outcome: none    LMP:  not applicable    Noted some blood when wiping    Some pain with urination. Has been there for several years.      Stuffy nose: using saline, and vicks, had been using affrin every day- has been off for several weeks. Used flonase in the past, not using right now.        Patient Active Problem List   Diagnosis     Anxiety state     ADHD (attention deficit hyperactivity disorder)     Obsessive-compulsive disorder, unspecified type     Active autistic disorder     Mood disorder (H)     Past Surgical History:   Procedure Laterality Date     CIRCUMCISION N/A 8/5/2015    Procedure: CIRCUMCISION;  Surgeon: Chapin Guzman MD;  Location:  OR       Social History     Tobacco Use     Smoking status: Never Smoker     Smokeless tobacco: Never Used   Substance Use Topics     Alcohol use: No     Family History   Adopted: Yes   Problem Relation Age of Onset     Family History Negative Mother              Reviewed and updated as needed this visit by Provider         Review of Systems   ROS COMP: Constitutional,  "HEENT, cardiovascular, pulmonary, GI, , musculoskeletal, neuro, skin, endocrine and psych systems are negative, except as otherwise noted.      Objective    /72 (BP Location: Right arm, Patient Position: Sitting, Cuff Size: Adult Regular)   Pulse 85   Temp 98.7  F (37.1  C) (Oral)   Ht 1.6 m (5' 3\")   Wt 64.9 kg (143 lb)   SpO2 98%   BMI 25.33 kg/m    Body mass index is 25.33 kg/m .  Physical Exam   GENERAL: healthy, alert and no distress  NECK: no adenopathy, no asymmetry, masses, or scars and thyroid normal to palpation  RESP: lungs clear to auscultation - no rales, rhonchi or wheezes  CV: regular rate and rhythm, normal S1 S2, no S3 or S4, no murmur, click or rub, no peripheral edema and peripheral pulses strong  ABDOMEN: soft, nontender, without hepatosplenomegaly or masses, bowel sounds normal and possible small deficit at the umbilicus, no obvious mass noted  MS: no gross musculoskeletal defects noted, no edema  SKIN: no suspicious lesions or rashes  NEURO: Normal strength and tone, mentation intact and speech normal    Diagnostic Test Results:  Labs reviewed in Epic  Constipation noted on xray        Assessment & Plan       ICD-10-CM    1. Dysuria R30.0 UA reflex to Microscopic and Culture     Urine Microscopic   2. Attention deficit hyperactivity disorder (ADHD), combined type F90.2 methylphenidate (CONCERTA) 54 MG CR tablet     methylphenidate (CONCERTA) 54 MG CR tablet     methylphenidate (CONCERTA) 54 MG CR tablet   3. Abdominal pain, generalized R10.84 CANCELED: XR KUB   4. Chronic rhinitis J31.0 fluticasone (FLONASE) 50 MCG/ACT nasal spray   5. Need for prophylactic vaccination and inoculation against influenza Z23 INFLUENZA VACCINE IM > 6 MONTHS VALENT IIV4 [26280]     Vaccine Administration, Initial [98979]        BMI:   Estimated body mass index is 25.33 kg/m  as calculated from the following:    Height as of this encounter: 1.6 m (5' 3\").    Weight as of this encounter: 64.9 kg (143 " lb).           Regular exercise    No follow-ups on file.    Tyson Paulino MD  [facility]

## 2019-09-27 NOTE — PATIENT INSTRUCTIONS
- Dr. Bangura is great, Dr. Abbasi, Dr. Gaming, Dr. Benavides (would be great) would all be great fit    1)  Refill medications for 3 months    2) We will check a urine to make sure no concerning changes    3) Xray shows a fair amount of constipation, let's start with miralax   - start with 1 capful per day to start with    - if not helping then would do 4 capfuls for 2 days as a trial to clean things out, then would go back down to 1 capful per day (1-2 months).    4) Try the flonase nasal spray and try to avoid afrin if able    Tyson Paulino MD

## 2019-10-01 ENCOUNTER — TELEPHONE (OUTPATIENT)
Dept: PEDIATRICS | Facility: CLINIC | Age: 19
End: 2019-10-01

## 2019-10-01 NOTE — TELEPHONE ENCOUNTER
Patient's mother called with concerns about UA results and bacteria present. Reassured per Dr. Paulino note that doesn't suspect infection due to WBC and leukocytes normal.    Patient still having discomfort at times when he urinates. Patient also had x-ray of abdomin and showed a stool back-up. Patient was to start taking Miralax and has not started that yet. Explained this could cause some discomfort and patient mother agreed to start this today.    Dr. Benavides: Patient scheduled to see you as possible new PCP in December. Do you want any further urinary testing for patient?    Brianna MENDIOLA RN, BSN

## 2019-10-01 NOTE — TELEPHONE ENCOUNTER
Reviewed Dr. Paulino note and labs.    No further testing at this time.  Agree with Miralax.    If symptoms worsen would need to be seen for further evaluation since I have never met patient.    Rita Benavides MD  Internal Medicine/Pediatrics  [facility]

## 2019-11-04 ENCOUNTER — HEALTH MAINTENANCE LETTER (OUTPATIENT)
Age: 19
End: 2019-11-04

## 2019-11-17 ENCOUNTER — NURSE TRIAGE (OUTPATIENT)
Dept: NURSING | Facility: CLINIC | Age: 19
End: 2019-11-17

## 2019-11-17 ENCOUNTER — HOSPITAL ENCOUNTER (EMERGENCY)
Facility: CLINIC | Age: 19
Discharge: HOME | End: 2019-11-17
Attending: EMERGENCY MEDICINE | Admitting: EMERGENCY MEDICINE
Payer: COMMERCIAL

## 2019-11-17 ENCOUNTER — APPOINTMENT (OUTPATIENT)
Dept: CT IMAGING | Facility: CLINIC | Age: 19
End: 2019-11-17
Attending: EMERGENCY MEDICINE
Payer: COMMERCIAL

## 2019-11-17 VITALS
DIASTOLIC BLOOD PRESSURE: 70 MMHG | HEIGHT: 63 IN | SYSTOLIC BLOOD PRESSURE: 124 MMHG | OXYGEN SATURATION: 99 % | BODY MASS INDEX: 25.69 KG/M2 | RESPIRATION RATE: 16 BRPM | HEART RATE: 84 BPM | WEIGHT: 145 LBS | TEMPERATURE: 98.7 F

## 2019-11-17 DIAGNOSIS — K51.00 PANCOLITIS (H): ICD-10-CM

## 2019-11-17 LAB
ALBUMIN SERPL-MCNC: 3.5 G/DL (ref 3.4–5)
ALBUMIN UR-MCNC: NEGATIVE MG/DL
ALP SERPL-CCNC: 87 U/L (ref 40–150)
ALT SERPL W P-5'-P-CCNC: 40 U/L (ref 0–70)
ANION GAP SERPL CALCULATED.3IONS-SCNC: 3 MMOL/L (ref 3–14)
APPEARANCE UR: CLEAR
AST SERPL W P-5'-P-CCNC: 29 U/L (ref 0–45)
BASOPHILS # BLD AUTO: 0 10E9/L (ref 0–0.2)
BASOPHILS NFR BLD AUTO: 0.3 %
BILIRUB SERPL-MCNC: 0.3 MG/DL (ref 0.2–1.3)
BILIRUB UR QL STRIP: NEGATIVE
BUN SERPL-MCNC: 14 MG/DL (ref 7–30)
C DIFF TOX B STL QL: NEGATIVE
CALCIUM SERPL-MCNC: 8.3 MG/DL (ref 8.5–10.1)
CHLORIDE SERPL-SCNC: 105 MMOL/L (ref 94–109)
CO2 SERPL-SCNC: 28 MMOL/L (ref 20–32)
COLOR UR AUTO: ABNORMAL
CREAT SERPL-MCNC: 0.94 MG/DL (ref 0.66–1.25)
DIFFERENTIAL METHOD BLD: ABNORMAL
EOSINOPHIL # BLD AUTO: 0.1 10E9/L (ref 0–0.7)
EOSINOPHIL NFR BLD AUTO: 2 %
ERYTHROCYTE [DISTWIDTH] IN BLOOD BY AUTOMATED COUNT: 12.5 % (ref 10–15)
GFR SERPL CREATININE-BSD FRML MDRD: >90 ML/MIN/{1.73_M2}
GLUCOSE SERPL-MCNC: 99 MG/DL (ref 70–99)
GLUCOSE UR STRIP-MCNC: NEGATIVE MG/DL
HCT VFR BLD AUTO: 38.1 % (ref 40–53)
HEMOCCULT STL QL: POSITIVE
HGB BLD-MCNC: 13 G/DL (ref 13.3–17.7)
HGB UR QL STRIP: ABNORMAL
IMM GRANULOCYTES # BLD: 0 10E9/L (ref 0–0.4)
IMM GRANULOCYTES NFR BLD: 0.2 %
KETONES UR STRIP-MCNC: NEGATIVE MG/DL
LEUKOCYTE ESTERASE UR QL STRIP: NEGATIVE
LIPASE SERPL-CCNC: 62 U/L (ref 73–393)
LYMPHOCYTES # BLD AUTO: 2.4 10E9/L (ref 0.8–5.3)
LYMPHOCYTES NFR BLD AUTO: 40.5 %
MCH RBC QN AUTO: 27.3 PG (ref 26.5–33)
MCHC RBC AUTO-ENTMCNC: 34.1 G/DL (ref 31.5–36.5)
MCV RBC AUTO: 80 FL (ref 78–100)
MONOCYTES # BLD AUTO: 1 10E9/L (ref 0–1.3)
MONOCYTES NFR BLD AUTO: 16.2 %
NEUTROPHILS # BLD AUTO: 2.4 10E9/L (ref 1.6–8.3)
NEUTROPHILS NFR BLD AUTO: 40.8 %
NITRATE UR QL: NEGATIVE
NRBC # BLD AUTO: 0 10*3/UL
NRBC BLD AUTO-RTO: 0 /100
PH UR STRIP: 6 PH (ref 5–7)
PLATELET # BLD AUTO: 182 10E9/L (ref 150–450)
POTASSIUM SERPL-SCNC: 3.6 MMOL/L (ref 3.4–5.3)
PROT SERPL-MCNC: 7.9 G/DL (ref 6.8–8.8)
RBC # BLD AUTO: 4.77 10E12/L (ref 4.4–5.9)
RBC #/AREA URNS AUTO: <1 /HPF (ref 0–2)
SODIUM SERPL-SCNC: 136 MMOL/L (ref 133–144)
SOURCE: ABNORMAL
SP GR UR STRIP: 1.01 (ref 1–1.03)
SPECIMEN SOURCE: NORMAL
UROBILINOGEN UR STRIP-MCNC: NORMAL MG/DL (ref 0–2)
WBC # BLD AUTO: 5.9 10E9/L (ref 4–11)
WBC #/AREA URNS AUTO: <1 /HPF (ref 0–5)

## 2019-11-17 PROCEDURE — 96375 TX/PRO/DX INJ NEW DRUG ADDON: CPT

## 2019-11-17 PROCEDURE — 82272 OCCULT BLD FECES 1-3 TESTS: CPT | Performed by: EMERGENCY MEDICINE

## 2019-11-17 PROCEDURE — 25000128 H RX IP 250 OP 636: Performed by: EMERGENCY MEDICINE

## 2019-11-17 PROCEDURE — 25000125 ZZHC RX 250: Performed by: EMERGENCY MEDICINE

## 2019-11-17 PROCEDURE — 87506 IADNA-DNA/RNA PROBE TQ 6-11: CPT | Performed by: EMERGENCY MEDICINE

## 2019-11-17 PROCEDURE — 81001 URINALYSIS AUTO W/SCOPE: CPT | Performed by: EMERGENCY MEDICINE

## 2019-11-17 PROCEDURE — 83690 ASSAY OF LIPASE: CPT | Performed by: EMERGENCY MEDICINE

## 2019-11-17 PROCEDURE — 25800030 ZZH RX IP 258 OP 636: Performed by: EMERGENCY MEDICINE

## 2019-11-17 PROCEDURE — 74177 CT ABD & PELVIS W/CONTRAST: CPT

## 2019-11-17 PROCEDURE — 96374 THER/PROPH/DIAG INJ IV PUSH: CPT | Mod: 59

## 2019-11-17 PROCEDURE — 80053 COMPREHEN METABOLIC PANEL: CPT | Performed by: EMERGENCY MEDICINE

## 2019-11-17 PROCEDURE — 96361 HYDRATE IV INFUSION ADD-ON: CPT

## 2019-11-17 PROCEDURE — 99285 EMERGENCY DEPT VISIT HI MDM: CPT | Mod: 25

## 2019-11-17 PROCEDURE — 87493 C DIFF AMPLIFIED PROBE: CPT | Performed by: EMERGENCY MEDICINE

## 2019-11-17 PROCEDURE — 85025 COMPLETE CBC W/AUTO DIFF WBC: CPT | Performed by: EMERGENCY MEDICINE

## 2019-11-17 RX ORDER — MORPHINE SULFATE 4 MG/ML
4 INJECTION, SOLUTION INTRAMUSCULAR; INTRAVENOUS
Status: DISCONTINUED | OUTPATIENT
Start: 2019-11-17 | End: 2019-11-18 | Stop reason: HOSPADM

## 2019-11-17 RX ORDER — ONDANSETRON 2 MG/ML
4 INJECTION INTRAMUSCULAR; INTRAVENOUS EVERY 30 MIN PRN
Status: DISCONTINUED | OUTPATIENT
Start: 2019-11-17 | End: 2019-11-18 | Stop reason: HOSPADM

## 2019-11-17 RX ORDER — SODIUM CHLORIDE 9 MG/ML
1000 INJECTION, SOLUTION INTRAVENOUS CONTINUOUS
Status: DISCONTINUED | OUTPATIENT
Start: 2019-11-17 | End: 2019-11-18 | Stop reason: HOSPADM

## 2019-11-17 RX ORDER — ONDANSETRON 4 MG/1
4-8 TABLET, ORALLY DISINTEGRATING ORAL EVERY 8 HOURS PRN
Qty: 30 TABLET | Refills: 0 | Status: SHIPPED | OUTPATIENT
Start: 2019-11-17 | End: 2019-11-17

## 2019-11-17 RX ORDER — ONDANSETRON 4 MG/1
4-8 TABLET, ORALLY DISINTEGRATING ORAL EVERY 8 HOURS PRN
Qty: 30 TABLET | Refills: 0 | Status: SHIPPED | OUTPATIENT
Start: 2019-11-17 | End: 2019-12-19

## 2019-11-17 RX ORDER — IOPAMIDOL 755 MG/ML
73 INJECTION, SOLUTION INTRAVASCULAR ONCE
Status: COMPLETED | OUTPATIENT
Start: 2019-11-17 | End: 2019-11-17

## 2019-11-17 RX ADMIN — SODIUM CHLORIDE 1000 ML: 9 INJECTION, SOLUTION INTRAVENOUS at 21:17

## 2019-11-17 RX ADMIN — ONDANSETRON 4 MG: 2 INJECTION INTRAMUSCULAR; INTRAVENOUS at 21:17

## 2019-11-17 RX ADMIN — IOPAMIDOL 73 ML: 755 INJECTION, SOLUTION INTRAVENOUS at 21:41

## 2019-11-17 RX ADMIN — MORPHINE SULFATE 4 MG: 4 INJECTION INTRAVENOUS at 21:17

## 2019-11-17 RX ADMIN — SODIUM CHLORIDE 62 ML: 9 INJECTION, SOLUTION INTRAVENOUS at 21:40

## 2019-11-17 ASSESSMENT — MIFFLIN-ST. JEOR: SCORE: 1562.85

## 2019-11-17 ASSESSMENT — ENCOUNTER SYMPTOMS
DIARRHEA: 1
BACK PAIN: 1
NECK PAIN: 1
VOMITING: 1
FEVER: 1
BLOOD IN STOOL: 1
ABDOMINAL PAIN: 1

## 2019-11-17 NOTE — ED AVS SNAPSHOT
Emergency Department  [address] [place] [id number]  Phone:  [phone]  Fax:  [phone]                                    Chino Jesus   MRN: [id number]    Department:   Emergency Department   Date of Visit:  11/17/2019           After Visit Summary Signature Page    I have received my discharge instructions, and my questions have been answered. I have discussed any challenges I see with this plan with the nurse or doctor.    ..........................................................................................................................................  Patient/Patient Representative Signature      ..........................................................................................................................................  Patient Representative Print Name and Relationship to Patient    ..................................................               ................................................  Date                                   Time    ..........................................................................................................................................  Reviewed by Signature/Title    ...................................................              ..............................................  Date                                               Time          22EPIC Rev 08/18

## 2019-11-18 ENCOUNTER — TELEPHONE (OUTPATIENT)
Dept: EMERGENCY MEDICINE | Facility: CLINIC | Age: 19
End: 2019-11-18

## 2019-11-18 LAB
C COLI+JEJUNI+LARI FUSA STL QL NAA+PROBE: ABNORMAL
EC STX1 GENE STL QL NAA+PROBE: NOT DETECTED
EC STX2 GENE STL QL NAA+PROBE: NOT DETECTED
ENTERIC PATHOGEN COMMENT: ABNORMAL
NOROV GI+II ORF1-ORF2 JNC STL QL NAA+PR: NOT DETECTED
RVA NSP5 STL QL NAA+PROBE: NOT DETECTED
SALMONELLA SP RPOD STL QL NAA+PROBE: NOT DETECTED
SHIGELLA SP+EIEC IPAH STL QL NAA+PROBE: NOT DETECTED
V CHOL+PARA RFBL+TRKH+TNAA STL QL NAA+PR: NOT DETECTED
Y ENTERO RECN STL QL NAA+PROBE: NOT DETECTED

## 2019-11-18 NOTE — ED TRIAGE NOTES
Intermittant fever since Wednesday, concurrent freq. urination and diarrhea yesterday. Abd. pain is periumbilical. Denies flank pain.

## 2019-11-18 NOTE — TELEPHONE ENCOUNTER
[facility] result notification:    [facility] lab result protocol used  Campylobacter protocol    Reason for call  Notify of lab results, assess symptoms,  review ED providers recommendations/discharge instructions (if necessary) and advise per ED lab result f/u protocol    Lab Result   Final Enteric Bacteria and Virus Panel by RALPH Stool is POSITIVE for Campylobacter group by RALPH  Patient to be notified, symptom's assessed and advised per [facility] ED lab result protocol.  Information table from ED Provider visit on 11/17/19  Symptoms reported at ED visit (Chief complaint, HPI) Abdominal Pain, Diarrhea, Fever     The history is provided by the patient.      Chino Jesus is a 19 year old male with a history of ADHD, anxiety, OCD and autism who presents with abdominal pain and intermittent fever and chills that began four days ago Since yesterday, the patient has also been experiencing diarrhea with dark, bloody looking stool. He had an episode of emesis today. The patient reports neck and back discomfort. The patient also has a rash on his forehead. He denies recent antibiotic use or any abdominal surgery. The patient took advil 12 hours prior to arrival. His fever was 103.8 last night and over 100 degrees earlier today.       ED providers Impression and Plan (applicable information) Chino Jesus is a 19 year old male who comes today with report of recurrent diarrhea, an episode of emesis and now with right lower quadrant pain.  He also reported a rash over his forehead which appears to be petechial.  He has no petechiae elsewhere or whatsoever.  I suspect this was from forceful vomiting.  He has no photophobia, neck stiffness or other meningeal signs and I do not believe he needs a lumbar puncture.  Laboratory studies were reassuring with normal platelets and a normal white blood cell count.  His stool studies did show some blood in the stool.  Enteric cultures  and studies are pending at this time along with C. difficile.  He has no risk factors for C. difficile.  A CT was obtained given the pain in the location of the pain.  It shows findings of pancolitis.  He has no history of ulcerative colitis and no known history of inflammatory bowel disease.  Given the possibility that antibiotics could make him worse, we will hold on antibiotics as the stool studies are pending.  He will go home with Tylenol for pain control.  A prescription with Zofran was provided for nausea and vomiting.  He is to follow-up with gastroenterology in the next week.  He and his dad were alerted that he may receive a call from the culture nurse follow-up team if his stool studies do indeed show an infectious cause for his symptoms.  He and his father asked to return at any time should he become worse.   Miscellaneous information na     RN Assessment (Patient s current Symptoms), include time called.  [Insert Left message here if message left]  3;14PM: Spoke with patient's mom. She states that they just got back from seeing the GI provider and the patient has been prescribed Zithromax and has a colonoscopy scheduled for tomorrow. She is aware of the culture results.   RN Recommendations/Instructions per [facility] ED lab result protocol  Patient's mom notified of lab result and treatment recommendation.   Reviewed Campylobacter information from protocol, RN reference guide/patient information.  Mom is comfortable with the information given and has no further questions.     Please Contact your PCP clinic or return to the Emergency department if your:    Symptoms worsen or other concerning symptom's.    PCP follow-up Questions asked: YES       [RN Name]  Rita Lepe RN  [facility] RN  Lung Nodule and ED Lab Result RN  Epic pool (ED late result f/u RN): [id number]  [facility] INCIDENTAL RADIOLOGY F/U NURSES: P [id number]  Ph# [phone]      Copy of Lab result   Enteric Bacteria and Virus Panel by RALPH  Stool [[id number]] (Order [id number])   Order Requisition     Enteric Bacteria and Virus Panel by RALPH Stool (Order #[id number]) on 11/17/19   Exam Information     Exam Date Exam Time Accession # Results    11/17/19  8:47 PM [id number]    Specimen Information: Feces        Component Value Flag Ref Range Units Status Collected Lab   Campylobacter group by RALPH Abnormal    NDET^Not Detected  Final 11/17/2019  8:47    Detected, Abnormal Result    Salmonella species by RALPH Not Detected   NDET^Not Detected  Final 11/17/2019  8:47    Shigella species by RAPLH Not Detected   NDET^Not Detected  Final 11/17/2019  8:47    Vibrio group by RALPH Not Detected   NDET^Not Detected  Final 11/17/2019  8:47    Rotavirus A by RALPH Not Detected   NDET^Not Detected  Final 11/17/2019  8:47    Shiga toxin 1 gene by RALPH Not Detected   NDET^Not Detected  Final 11/17/2019  8:47    Shiga toxin 2 gene by RALPH Not Detected   NDET^Not Detected  Final 11/17/2019  8:47    Norovirus I and II by RALPH Not Detected   NDET^Not Detected  Final 11/17/2019  8:47    Yersinia enterocolitica by RALPH Not Detected   NDET^Not Detected  Final 11/17/2019  8:47    Enteric pathogen comment     Final 11/17/2019  8:47    Testing performed by multiplexed, qualitative PCR using the [employer] Enteric   Pathogens Nucleic Acid Test. Results should not be used as the sole basis for diagnosis,   treatment, or other patient management decisions.    Comment:   Positive results do not rule out co-infection with other organisms that are   not detected by this test, and may not be the sole or definitive cause of   patient illness.   Negative results in the setting of clinical illness compatible with   gastroenteritis may be due to infection by pathogens that are not detected by   this test or non-infectious causes such as ulcerative colitis, irritable bowel    syndrome, or Crohn's disease.   Note: Shiga toxin  producing E. coli (STEC) typically harbor one or both genes   that encode for Shiga toxins 1 and 2.

## 2019-11-18 NOTE — TELEPHONE ENCOUNTER
"Call received from father, Ronald.  Consent to communicate verified on chart.    Chino has been sick for several days, primarily with fever, diarrhea and GI upset.    He woke Thurs with fever, chills a very sore neck and back and complaints of  stomach pain.  Temp was \"high 102 's\" - orally    Diarrhea started Fri morning - episodes occurring a minimum of 10 a day; Estimates 7 to 20 episodes.    Temps have been ranging 101  to 103  orally with antipyretics on board.    Sat morning, Chino was seen in an urgent care in [place], [place]    A swab for influenza was done and tested negative. They were told his symptoms were likely viral.    \"Severe\" diarrhea continued    4:30 am Sunday morning Chino vomited green bile.    He had an episode of diarrhea this evening that had a light reddish color to it. Uncertain if it was blood. Denies ingesting red colored foods or drinks.    He has developed a reddish, splotchy, non-raised rash on his forehead and temples.    His current Temp = 100.6  orally, (with antipyretics)    Per protocol, advised to be seen by a provider within 4 hours.  Will likely go to [facility].    Bhumi Ruiz RN  [facility]      Reason for Disposition    [1] Constant abdominal pain AND [2] present > 2 hours    Additional Information    Negative: Shock suspected (e.g., cold/pale/clammy skin, too weak to stand, low BP, rapid pulse)    Negative: Difficult to awaken or acting confused (e.g., disoriented, slurred speech)    Negative: Sounds like a life-threatening emergency to the triager    Negative: [1] SEVERE abdominal pain (e.g., excruciating) AND [2] present > 1 hour    Negative: [1] SEVERE abdominal pain AND [2] age > 60    Negative: [1] Blood in the stool AND [2] moderate or large amount of blood    Negative: Black or tarry bowel movements  (Exception: chronic-unchanged  black-grey bowel movements AND is taking iron pills or Pepto-bismol)    Negative: [1] Drinking very little " AND [2] dehydration suspected (e.g., no urine > 12 hours, very dry mouth, very lightheaded)    Negative: Patient sounds very sick or weak to the triager    Negative: [1] SEVERE diarrhea (e.g., 7 or more times / day more than normal) AND [2]  age > 60 years    Protocols used: DIARRHEA-A-AH

## 2019-11-18 NOTE — ED PROVIDER NOTES
"  History     Chief Complaint:    Abdominal Pain, Diarrhea, Fever    The history is provided by the patient.      Chino Jesus is a 19 year old male with a history of ADHD, anxiety, OCD and autism who presents with abdominal pain and intermittent fever and chills that began four days ago Since yesterday, the patient has also been experiencing diarrhea with dark, bloody looking stool. He had an episode of emesis today. The patient reports neck and back discomfort. The patient also has a rash on his forehead. He denies recent antibiotic use or any abdominal surgery. The patient took advil 12 hours prior to arrival. His fever was 103.8 last night and over 100 degrees earlier today.       Allergies:  No Known Allergies     Medications:    Concerta  Zoloft   Mehtylphenidate  sertraline    Past Medical History:    ADHD  Anxiety  OCD  Pervasive developmental disorder  Mood disorder  Autism     Past Surgical History:    Circumcision    Family History:    No past pertinent family history.    Social History:  Presents to the ED with his father at the bedside  Tobacco Use: no  Alcohol Use: no  Drug Use: no  Marital Status:  Single [1]     Review of Systems   Constitutional: Positive for fever.   Gastrointestinal: Positive for abdominal pain, blood in stool, diarrhea and vomiting.   Musculoskeletal: Positive for back pain and neck pain.   Skin: Positive for rash.   All other systems reviewed and are negative.    Physical Exam     Patient Vitals for the past 24 hrs:   BP Temp Temp src Pulse Heart Rate Resp SpO2 Height Weight   11/17/19 2252 124/70 -- -- 84 -- -- -- -- --   11/17/19 2004 (!) 143/78 98.7  F (37.1  C) Oral -- 87 16 99 % 1.6 m (5' 3\") 65.8 kg (145 lb)     Physical Exam  General: Well-nourished, appears to be resting comfortably when I enter the room  Eyes: PERRL, conjunctivae pink no scleral icterus or conjunctival injection. No photophobia.  ENT:  Moist mucus membranes, posterior oropharynx clear without " erythema or exudates  Respiratory:  Lungs clear to auscultation bilaterally, no crackles/rubs/wheezes.  Good air movement  CV: Normal rate and rhythm, no murmurs/rubs/gallops  GI:  Abdomen soft and non-distended.  Normoactive BS.  Moderately tender over RLQ, no guarding or rebound  Skin: Warm, dry.  Scattered petechiae over forehead and face. No petechiae elswhere  Musculoskeletal: No peripheral edema or calf tenderness  Neuro: Alert and oriented to person/place/time. Neck supple.  Psychiatric: Normal affect      Emergency Department Course     Imaging:  Radiology findings were communicated with the patient who voiced understanding of the findings.    CT Abdomen Pelvis w/ IV contrast:   IMPRESSION:  1. Near pancolitis concerning for an infectious or inflammatory  etiology. Ulcerative colitis remains in the differential. Follow-up  with gastroenterology suggested.  2. Abdominal and pelvic organs are otherwise unremarkable. Appendix is  normal. Small bowel appears uninvolved.      Laboratory:  Laboratory findings were communicated with the patient who voiced understanding of the findings.    CBC: WBC: 5.9, HGB: 13.0 (L), PLT: 182  CMP: Calcium 8.3 (L) o/w WNL (Creatinine 0.94)  Lipase: 62 (L)    UA with Microscopic: Blood trace (A) o/w WNL    Occult Blood Stool: Positive (A)  Enteric Bacteria and Virus Panel by RALPH Stool: PEnding  Clostridium Difficile Toxin B PCR: Pending    Interventions:  2117 Zofran 4 mg IV  2117 0.9% NaCl Bolus 1000mLs IV   2117 Morphine 4 mg IV      Emergency Department Course:  Past medical records, nursing notes, and vitals reviewed.    IV was inserted and blood was drawn for laboratory testing, results above.  The patient was sent for a CT while in the emergency department, results above.   The patient provided a urine sample here in the emergency department. This was sent for laboratory testing, findings above.  Stool sample was obtained and sent for laboratory analysis, findings  above.    2019: I performed an exam of the patient as documented above.     2200: Patient rechecked and updated.      I personally reviewed the laboratory and imaging results with the Patient and answered all related questions prior to discharge.     Impression & Plan     Medical Decision Making:  Chino Jesus is a 19 year old male who comes today with report of recurrent diarrhea, an episode of emesis and now with right lower quadrant pain.  He also reported a rash over his forehead which appears to be petechial.  He has no petechiae elsewhere or whatsoever.  I suspect this was from forceful vomiting.  He has no photophobia, neck stiffness or other meningeal signs and I do not believe he needs a lumbar puncture.  Laboratory studies were reassuring with normal platelets and a normal white blood cell count.  His stool studies did show some blood in the stool.  Enteric cultures and studies are pending at this time along with C. difficile.  He has no risk factors for C. difficile.  A CT was obtained given the pain in the location of the pain.  It shows findings of pancolitis.  He has no history of ulcerative colitis and no known history of inflammatory bowel disease.  Given the possibility that antibiotics could make him worse, we will hold on antibiotics as the stool studies are pending.  He will go home with Tylenol for pain control.  A prescription with Zofran was provided for nausea and vomiting.  He is to follow-up with gastroenterology in the next week.  He and his dad were alerted that he may receive a call from the culture nurse follow-up team if his stool studies do indeed show an infectious cause for his symptoms.  He and his father asked to return at any time should he become worse.    Discharge Diagnosis:    ICD-10-CM    1. Pancolitis (H) K51.00 Clostridium difficile toxin B PCR         Disposition:  Home    Discharge Medications:  Zofran      Scribe Disclosure:  KENDRICK, Belgica Mayes, am serving as a  scribe at 8:19 PM on 11/17/2019 to document services personally performed by Colleen Nathan MD based on my observations and the provider's statements to me.     11/17/2019    EMERGENCY DEPARTMENT       Colleen Nathan MD  11/18/19 0040

## 2019-11-18 NOTE — DISCHARGE INSTRUCTIONS
*Get plenty of rest and avoid strenuous activities.  *Take medications as prescribed.  Tylenol for pain.  Zofran as directed as needed for nausea.   *Follow-up with your doctor for a recheck within 12-36 hours.  Recommend follow-up with gastroenterology in the next week.  *Return to the ER if you develop worsening pain, faint or feel like you will faint or become worse in any way.

## 2019-12-17 NOTE — PROGRESS NOTES
Pre-Visit Planning     Future Appointments   Date Time Provider Department Center   12/19/2019  2:10 PM Rita Benavides MD [facility]     Arrival Time for this Appointment:  2:10 PM   Appointment Notes for this encounter:   Per Dr. Paulino    Questionnaires Reviewed/Assigned  No additional questionnaires are needed       Patient preferred phone number: [phone]    Spoke to patient via phone. Patient does not have additional questions or concerns.            Isaias Orr [facility] Health Guide on 12/17/2019 at 3:51 PM

## 2019-12-19 ENCOUNTER — OFFICE VISIT (OUTPATIENT)
Dept: PEDIATRICS | Facility: CLINIC | Age: 19
End: 2019-12-19
Payer: COMMERCIAL

## 2019-12-19 VITALS
HEART RATE: 71 BPM | OXYGEN SATURATION: 96 % | DIASTOLIC BLOOD PRESSURE: 62 MMHG | WEIGHT: 143.9 LBS | TEMPERATURE: 99 F | BODY MASS INDEX: 25.49 KG/M2 | SYSTOLIC BLOOD PRESSURE: 118 MMHG

## 2019-12-19 DIAGNOSIS — F90.2 ATTENTION DEFICIT HYPERACTIVITY DISORDER (ADHD), COMBINED TYPE: ICD-10-CM

## 2019-12-19 DIAGNOSIS — F39 MOOD DISORDER (H): ICD-10-CM

## 2019-12-19 DIAGNOSIS — F41.9 ANXIETY: ICD-10-CM

## 2019-12-19 DIAGNOSIS — K51.80 OTHER ULCERATIVE COLITIS WITHOUT COMPLICATION (H): ICD-10-CM

## 2019-12-19 DIAGNOSIS — E55.9 VITAMIN D DEFICIENCY: Primary | ICD-10-CM

## 2019-12-19 PROCEDURE — 36415 COLL VENOUS BLD VENIPUNCTURE: CPT | Performed by: PEDIATRICS

## 2019-12-19 PROCEDURE — 99214 OFFICE O/P EST MOD 30 MIN: CPT | Performed by: PEDIATRICS

## 2019-12-19 PROCEDURE — 82306 VITAMIN D 25 HYDROXY: CPT | Performed by: PEDIATRICS

## 2019-12-19 RX ORDER — SULFASALAZINE 500 MG/1
2000 TABLET ORAL 3 TIMES DAILY
COMMUNITY
Start: 2019-11-19 | End: 2020-06-25

## 2019-12-19 RX ORDER — SERTRALINE HYDROCHLORIDE 100 MG/1
200 TABLET, FILM COATED ORAL DAILY
Qty: 180 TABLET | Refills: 1 | Status: SHIPPED | OUTPATIENT
Start: 2019-12-19 | End: 2020-06-25

## 2019-12-19 RX ORDER — METHYLPHENIDATE HYDROCHLORIDE 54 MG/1
54 TABLET ORAL DAILY
Qty: 30 TABLET | Refills: 0 | Status: SHIPPED | OUTPATIENT
Start: 2020-02-24 | End: 2020-06-25

## 2019-12-19 RX ORDER — METHYLPHENIDATE HYDROCHLORIDE 54 MG/1
54 TABLET ORAL DAILY
Qty: 30 TABLET | Refills: 0 | Status: SHIPPED | OUTPATIENT
Start: 2019-12-26 | End: 2020-03-25

## 2019-12-19 RX ORDER — METHYLPHENIDATE HYDROCHLORIDE 54 MG/1
54 TABLET ORAL DAILY
Qty: 30 TABLET | Refills: 0 | Status: SHIPPED | OUTPATIENT
Start: 2020-01-25 | End: 2020-06-25

## 2019-12-19 NOTE — PROGRESS NOTES
"Subjective     Chino Jessu is a 19 year old male who presents to clinic today for the following health issues:    HPI   Medication Followup of sertaline and concerta    Taking Medication as prescribed: yes    Side Effects:  Tired in the morning, sometimes feels abhishek tired throughout the day    Medication Helping Symptoms:  Yes    Happy with current doses of both these medications       New diagnosis of ulcerative colitis - follows with Dr. Moran - recently started Sulfasalazine and starting to feel better.  Waiting to insurance coverage for capsule endoscopy to see if this might be Crohns.    Patient transferring care from Dr. Paulino to myself.    Here today with mom.  Patient lives at home, adopted.  He is currently at [employer]/job training.  Very active outside with fishing/hunting.  Would like to work in  or resort some day.    Headache  Description:   Location: describes it as being on the top of his head with pressure, says no relief with tylenol.  Character: throbbing pain, dull pain,   Frequency:  Up to 3 times a week. Mom says \"it seems to come in cycles\"  Duration:  Up to 8hrs, says one lasted for \"2 days\"  Intensity: 8/10  Progression of Symptoms:  Mom says \"inconsistant\"  Accompanying Signs & Symptoms:  Stiff neck: no  Neck or upper back pain: no  Fever: no   Sinus pressure: no   Nausea or vomiting: no   Dizziness: no   Numbness: no   Weakness: no   Visual changes: no   History:   Head trauma: no   Family history of migraines: unkown, adopted  Previous tests for headaches: no   Neurologist evaluations: YES- but not for headaches  Able to do daily activities: no   Wake with a headaches: YES- has happened before but no frequent, has been woken up from headache before  Do headaches wake you up: YES  Daily pain medication use: YES- nearly everyday for general aches and pains  Work/school stressors/changes: YES- new diagnoses, was recently in hospital for inf.  Precipitating " "factors:   Does light make it worse: no   Does sound make it worse: no   Alleviating factors:  Does sleep help: unsure  Therapies Tried and outcome: Ibuprofen (Advil, Motrin) and Tylenol  No longer taking Ibuprofen.     Regarding above headaches - previously taking tylenol and ibuprofen daily - has stopped ibuprofen since GI diagnosis.  Starting to cut back on Tylenol, but mom states he gets worried he \"might\" had an ache or pain and takes this almost daily.    Reviewed and updated as needed this visit by Provider  Meds         Review of Systems   ROS COMP: Constitutional, HEENT, cardiovascular, pulmonary, gi and gu systems are negative, except as otherwise noted.      Objective    /62 (BP Location: Right arm, Patient Position: Sitting, Cuff Size: Adult Regular)   Pulse 71   Temp 99  F (37.2  C)   Wt 65.3 kg (143 lb 14.4 oz)   SpO2 96%   BMI 25.49 kg/m    Body mass index is 25.49 kg/m .  Physical Exam   GENERAL APPEARANCE: healthy, alert and no distress  RESP: lungs clear to auscultation - no rales, rhonchi or wheezes  CV: regular rates and rhythm, normal S1 S2, no S3 or S4 and no murmur, click or rub    Diagnostic Test Results:  Labs reviewed in Epic        Assessment & Plan     1. Anxiety  controlled  - sertraline (ZOLOFT) 100 MG tablet; Take 2 tablets (200 mg) by mouth daily  Dispense: 180 tablet; Refill: 1    2. Mood disorder (H)  controlled  - sertraline (ZOLOFT) 100 MG tablet; Take 2 tablets (200 mg) by mouth daily  Dispense: 180 tablet; Refill: 1    3. Vitamin D deficiency  Currently not taking any supplement - mom states he has been low in the past  - Vitamin D Deficiency    4. Other ulcerative colitis without complication (H)  Following closely with GI, Dr. Moran    5. Attention deficit hyperactivity disorder (ADHD), combined type  Stable, follow up in 6 months  - methylphenidate (CONCERTA) 54 MG CR tablet; Take 1 tablet (54 mg) by mouth daily  Dispense: 30 tablet; Refill: 0  - methylphenidate " (CONCERTA) 54 MG CR tablet; Take 1 tablet (54 mg) by mouth daily  Dispense: 30 tablet; Refill: 0  - methylphenidate (CONCERTA) 54 MG CR tablet; Take 1 tablet (54 mg) by mouth daily  Dispense: 30 tablet; Refill: 0       Patient Instructions   Continue Concerta 54mg  -- plan to follow up in 6 months.    Check vitamin D today.          Return in about 6 months (around 6/19/2020) for Depression/Anxiety and ADHD.    Rita Benavides MD  [facility]

## 2019-12-22 LAB — DEPRECATED CALCIDIOL+CALCIFEROL SERPL-MC: 26 UG/L (ref 20–75)

## 2020-03-25 DIAGNOSIS — F90.2 ATTENTION DEFICIT HYPERACTIVITY DISORDER (ADHD), COMBINED TYPE: ICD-10-CM

## 2020-03-25 RX ORDER — METHYLPHENIDATE HYDROCHLORIDE 54 MG/1
54 TABLET ORAL DAILY
Qty: 30 TABLET | Refills: 0 | Status: SHIPPED | OUTPATIENT
Start: 2020-03-25 | End: 2021-02-03

## 2020-03-25 NOTE — TELEPHONE ENCOUNTER
Pending Prescriptions:                       Disp   Refills    methylphenidate (CONCERTA) 54 MG CR [id number] tab*0            Sig: Take 1 tablet (54 mg) by mouth daily    Please contact mother Ani with further questions/concerns.    Alice Renae on 3/25/2020 at 11:29 AM

## 2020-05-26 ENCOUNTER — MEDICAL CORRESPONDENCE (OUTPATIENT)
Dept: HEALTH INFORMATION MANAGEMENT | Facility: CLINIC | Age: 20
End: 2020-05-26

## 2020-05-26 DIAGNOSIS — F90.2 ATTENTION DEFICIT HYPERACTIVITY DISORDER (ADHD), COMBINED TYPE: ICD-10-CM

## 2020-05-27 RX ORDER — METHYLPHENIDATE HYDROCHLORIDE 54 MG/1
54 TABLET ORAL DAILY
Qty: 30 TABLET | Refills: 0 | Status: SHIPPED | OUTPATIENT
Start: 2020-05-27 | End: 2021-08-12

## 2020-05-29 ENCOUNTER — TRANSFERRED RECORDS (OUTPATIENT)
Dept: HEALTH INFORMATION MANAGEMENT | Facility: CLINIC | Age: 20
End: 2020-05-29

## 2020-06-03 ENCOUNTER — TRANSFERRED RECORDS (OUTPATIENT)
Dept: HEALTH INFORMATION MANAGEMENT | Facility: CLINIC | Age: 20
End: 2020-06-03

## 2020-06-25 ENCOUNTER — VIRTUAL VISIT (OUTPATIENT)
Dept: PEDIATRICS | Facility: CLINIC | Age: 20
End: 2020-06-25
Payer: COMMERCIAL

## 2020-06-25 DIAGNOSIS — F39 MOOD DISORDER (H): ICD-10-CM

## 2020-06-25 DIAGNOSIS — F41.9 ANXIETY: ICD-10-CM

## 2020-06-25 DIAGNOSIS — F90.2 ATTENTION DEFICIT HYPERACTIVITY DISORDER (ADHD), COMBINED TYPE: ICD-10-CM

## 2020-06-25 DIAGNOSIS — K51.80 OTHER ULCERATIVE COLITIS WITHOUT COMPLICATION (H): Primary | ICD-10-CM

## 2020-06-25 DIAGNOSIS — R10.30 LOWER ABDOMINAL PAIN: ICD-10-CM

## 2020-06-25 PROCEDURE — 99214 OFFICE O/P EST MOD 30 MIN: CPT | Mod: 95 | Performed by: PEDIATRICS

## 2020-06-25 RX ORDER — CHOLESTYRAMINE 4 G/9G
POWDER, FOR SUSPENSION ORAL PRN
COMMUNITY
Start: 2020-06-04

## 2020-06-25 RX ORDER — METHYLPHENIDATE HYDROCHLORIDE 54 MG/1
54 TABLET ORAL DAILY
Qty: 30 TABLET | Refills: 0 | Status: SHIPPED | OUTPATIENT
Start: 2020-07-26 | End: 2021-08-12

## 2020-06-25 RX ORDER — SERTRALINE HYDROCHLORIDE 100 MG/1
200 TABLET, FILM COATED ORAL DAILY
Qty: 180 TABLET | Refills: 1 | Status: SHIPPED | OUTPATIENT
Start: 2020-06-25 | End: 2021-02-03

## 2020-06-25 RX ORDER — METHYLPHENIDATE HYDROCHLORIDE 54 MG/1
54 TABLET ORAL DAILY
Qty: 30 TABLET | Refills: 0 | Status: SHIPPED | OUTPATIENT
Start: 2020-08-26 | End: 2020-09-25

## 2020-06-25 RX ORDER — METHYLPHENIDATE HYDROCHLORIDE 54 MG/1
54 TABLET ORAL DAILY
Qty: 30 TABLET | Refills: 0 | Status: SHIPPED | OUTPATIENT
Start: 2020-06-25 | End: 2020-10-26

## 2020-06-25 RX ORDER — DICYCLOMINE HYDROCHLORIDE 10 MG/1
CAPSULE ORAL
COMMUNITY
Start: 2020-06-23 | End: 2024-07-16

## 2020-06-25 ASSESSMENT — ANXIETY QUESTIONNAIRES
5. BEING SO RESTLESS THAT IT IS HARD TO SIT STILL: NEARLY EVERY DAY
6. BECOMING EASILY ANNOYED OR IRRITABLE: SEVERAL DAYS
7. FEELING AFRAID AS IF SOMETHING AWFUL MIGHT HAPPEN: NOT AT ALL
GAD7 TOTAL SCORE: 6
3. WORRYING TOO MUCH ABOUT DIFFERENT THINGS: SEVERAL DAYS
2. NOT BEING ABLE TO STOP OR CONTROL WORRYING: SEVERAL DAYS
IF YOU CHECKED OFF ANY PROBLEMS ON THIS QUESTIONNAIRE, HOW DIFFICULT HAVE THESE PROBLEMS MADE IT FOR YOU TO DO YOUR WORK, TAKE CARE OF THINGS AT HOME, OR GET ALONG WITH OTHER PEOPLE: SOMEWHAT DIFFICULT
1. FEELING NERVOUS, ANXIOUS, OR ON EDGE: NOT AT ALL

## 2020-06-25 ASSESSMENT — PATIENT HEALTH QUESTIONNAIRE - PHQ9: 5. POOR APPETITE OR OVEREATING: NOT AT ALL

## 2020-06-25 NOTE — PROGRESS NOTES
"Chino Jesus is a 19 year old male who is being evaluated via a billable video visit.      The patient has been notified of following:     \"This video visit will be conducted via a call between you and your physician/provider. We have found that certain health care needs can be provided without the need for an in-person physical exam.  This service lets us provide the care you need with a video conversation.  If a prescription is necessary we can send it directly to your pharmacy.  If lab work is needed we can place an order for that and you can then stop by our lab to have the test done at a later time.    Video visits are billed at different rates depending on your insurance coverage.  Please reach out to your insurance provider with any questions.    If during the course of the call the physician/provider feels a video visit is not appropriate, you will not be charged for this service.\"    Patient has given verbal consent for Video visit? Yes    Will anyone else be joining your video visit? No    Subjective     Chino Jesus is a 19 year old male who presents today via video visit for the following health issues:    History of Present Illness        He eats 2-3 servings of fruits and vegetables daily.He consumes 0 sweetened beverage(s) daily.He exercises with enough effort to increase his heart rate 20 to 29 minutes per day.  He exercises with enough effort to increase his heart rate 4 days per week.   He is taking medications regularly.    Staying healthy with family during COVID.      Medication Followup of  Concerta     Taking Medication as prescribed: yes    Side Effects:  None    Medication Helping Symptoms:  yes     Feels that Concerta and sertrline going very well, no concerns.    GI update - had recent colonoscopy to see how the ulcerative colitis is - looking good so sulfasalazine stopped.  Still on the cholestyramine and dicyclomine for IBS.    Other issues:    --for the past 9 years " "patient concerned about \"bladder\" - sometimes \"revs up like crazy\" then it hurts so bad.  Bad pain and then aches, hard to move. Better with tylenol.  Sometimes happens 2 times per month, then months without.  He does have dysuria during this time, but better after a few hours.  Mom confirms that he does \"double over\" in pain.  Circumcised at age 14, but states pain started before then.  Thinks happening a little more frequently.  Unsure if hernia, but mom thinks this is the case.          Video Start Time: 9:06am    Reviewed and updated as needed this visit by Provider         Review of Systems   Constitutional, HEENT, cardiovascular, pulmonary, gi and gu systems are negative, except as otherwise noted.      Objective             Physical Exam     GENERAL: Healthy, alert and no distress  EYES: Eyes grossly normal to inspection.  No discharge or erythema, or obvious scleral/conjunctival abnormalities.  RESP: No audible wheeze, cough, or visible cyanosis.  No visible retractions or increased work of breathing.    SKIN: Visible skin clear. No significant rash, abnormal pigmentation or lesions.  NEURO: Cranial nerves grossly intact.  Mentation and speech appropriate for age.  PSYCH: Mentation appears normal, affect normal/bright, judgement and insight intact, normal speech and appearance well-groomed.      Diagnostic Test Results:  Labs reviewed in Epic        Assessment & Plan     1. Attention deficit hyperactivity disorder (ADHD), combined type  Controlled, refill  - methylphenidate (CONCERTA) 54 MG CR tablet; Take 1 tablet (54 mg) by mouth daily  Dispense: 30 tablet; Refill: 0  - methylphenidate (CONCERTA) 54 MG CR tablet; Take 1 tablet (54 mg) by mouth daily  Dispense: 30 tablet; Refill: 0  - methylphenidate (CONCERTA) 54 MG CR tablet; Take 1 tablet (54 mg) by mouth daily  Dispense: 30 tablet; Refill: 0    2. Anxiety  Controlled, refill  - sertraline (ZOLOFT) 100 MG tablet; Take 2 tablets (200 mg) by mouth daily  " Dispense: 180 tablet; Refill: 1    3. Mood disorder (H)  controlled  - sertraline (ZOLOFT) 100 MG tablet; Take 2 tablets (200 mg) by mouth daily  Dispense: 180 tablet; Refill: 1    4. Other ulcerative colitis without complication (H)  Per GI - now off sulfasalazine    5. Lower abdominal pain  Per history this sounds more like hernia vs a bladder issue - discussed with patient and mom we will do exam at next visit, until then keep track of frequency and triggers.           Patient Instructions   Erick Doshi to see you today.  I have sent in refills of your medication.     We will do an exam to look for a hernia a your next visit.    See you in about 6 months for a physical.    Rita Benavides MD  Internal Medicine/Pediatrics  [facility]          Return in about 6 months (around 12/25/2020) for Preventative Visit (physical).    Rita Benavides MD  [facility]      Video-Visit Details    Type of service:  Video Visit    Video End Time:9:18am    Originating Location (pt. Location): Home    Distant Location (provider location):  [facility]     Platform used for Video Visit: [facility]    Return in about 6 months (around 12/25/2020) for Preventative Visit (physical).       Rita Benavides MD

## 2020-06-25 NOTE — PATIENT INSTRUCTIONS
Luis A Magana,    Nice to see you today.  I have sent in refills of your medication.     We will do an exam to look for a hernia a your next visit.    See you in about 6 months for a physical.    Rita Benavides MD  Internal Medicine/Pediatrics  [facility]

## 2020-06-26 ENCOUNTER — TELEPHONE (OUTPATIENT)
Dept: PEDIATRICS | Facility: CLINIC | Age: 20
End: 2020-06-26

## 2020-06-26 ASSESSMENT — ANXIETY QUESTIONNAIRES: GAD7 TOTAL SCORE: 6

## 2020-06-26 NOTE — TELEPHONE ENCOUNTER
Called patient and spoke to mother, scheduled appointment.     Gisel Brown, EMT at 3:49 PM on June 26, 2020  [facility] Health Guide   [phone]

## 2020-09-25 DIAGNOSIS — F90.2 ATTENTION DEFICIT HYPERACTIVITY DISORDER (ADHD), COMBINED TYPE: ICD-10-CM

## 2020-09-25 RX ORDER — METHYLPHENIDATE HYDROCHLORIDE 54 MG/1
54 TABLET ORAL DAILY
Qty: 30 TABLET | Refills: 0 | Status: SHIPPED | OUTPATIENT
Start: 2020-09-25 | End: 2020-10-26

## 2020-09-25 NOTE — TELEPHONE ENCOUNTER
Medication Question or Refill    Who is calling: mother/guardian    What medication are you calling about (include dose and sig)?: methylphenidate (CONCERTA) 54 MG CR tablet    Controlled Substance Agreement on file: No    Who prescribed the medication?: Dr Benavides    Do you need a refill? Yes: will be out monday     When did you use the medication last? today    Patient offered an appointment? No    Do you have any questions or concerns?  No    [facility]      Okay to leave a detailed message?: Yes at Cell number on file:    Telephone Information:   Mobile [phone]

## 2020-09-25 NOTE — TELEPHONE ENCOUNTER
Routing refill request to provider for review/approval because:  Drug not on the [facility] refill protocol     Bharti HAMM RN, BSN

## 2020-10-12 ENCOUNTER — OFFICE VISIT (OUTPATIENT)
Dept: PEDIATRICS | Facility: CLINIC | Age: 20
End: 2020-10-12
Payer: COMMERCIAL

## 2020-10-12 VITALS
HEART RATE: 88 BPM | DIASTOLIC BLOOD PRESSURE: 74 MMHG | RESPIRATION RATE: 16 BRPM | TEMPERATURE: 98.8 F | SYSTOLIC BLOOD PRESSURE: 122 MMHG | BODY MASS INDEX: 23.22 KG/M2 | HEIGHT: 64 IN | WEIGHT: 136 LBS

## 2020-10-12 DIAGNOSIS — Z23 NEED FOR PROPHYLACTIC VACCINATION AND INOCULATION AGAINST INFLUENZA: ICD-10-CM

## 2020-10-12 DIAGNOSIS — R59.1 LAD (LYMPHADENOPATHY): Primary | ICD-10-CM

## 2020-10-12 PROCEDURE — 99213 OFFICE O/P EST LOW 20 MIN: CPT | Mod: 25 | Performed by: PHYSICIAN ASSISTANT

## 2020-10-12 PROCEDURE — 90471 IMMUNIZATION ADMIN: CPT | Performed by: PHYSICIAN ASSISTANT

## 2020-10-12 PROCEDURE — 90686 IIV4 VACC NO PRSV 0.5 ML IM: CPT | Performed by: PHYSICIAN ASSISTANT

## 2020-10-12 ASSESSMENT — MIFFLIN-ST. JEOR: SCORE: 1533.92

## 2020-10-12 NOTE — PROGRESS NOTES
"Subjective     Chino Jesus is a 19 year old male, accompanied by mother, who presents to clinic today for the following health issues:    HPI         Concern - Lump  Onset: less than one week  Description: Lt side  Intensity: moderate  Progression of Symptoms:  Smaller and improving  Accompanying Signs & Symptoms: tender when touching to much  Previous history of similar problem: NA  Precipitating factors:        Worsened by: pushing on it  Alleviating factors:        Improved by: not touching  Therapies tried and outcome:  none   Mild PND. No other URI symptoms.     Review of Systems   Constitutional, HEENT, cardiovascular, pulmonary, gi and gu systems are negative, except as otherwise noted.      Objective    /74   Pulse 88   Temp 98.8  F (37.1  C) (Oral)   Resp 16   Ht 1.619 m (5' 3.75\")   Wt 61.7 kg (136 lb)   BMI 23.53 kg/m    Body mass index is 23.53 kg/m .  Physical Exam   GENERAL: alert and no distress  EYES: Eyes grossly normal to inspection, PERRL and conjunctivae and sclerae normal  HENT: ear canals and TM's normal, nose and mouth without ulcers or lesions  NECK: small LN noted on the left lateral neck--nontender  RESP: lungs clear to auscultation - no rales, rhonchi or wheezes  CV: regular rate and rhythm, normal S1 S2, no S3 or S4    No results found for this or any previous visit (from the past 24 hour(s)).        Assessment & Plan   LAD (lymphadenopathy)  Improving. Continue to monitor. Stop manipulating site.    Need for prophylactic vaccination and inoculation against influenza  Updated.  - INFLUENZA VACCINE IM > 6 MONTHS VALENT IIV4 [14429]  - Vaccine Administration, Initial [76854]      Kj Fermin PA-C  [facility]    "

## 2020-10-26 DIAGNOSIS — F90.2 ATTENTION DEFICIT HYPERACTIVITY DISORDER (ADHD), COMBINED TYPE: ICD-10-CM

## 2020-10-26 RX ORDER — METHYLPHENIDATE HYDROCHLORIDE 54 MG/1
54 TABLET ORAL DAILY
Qty: 30 TABLET | Refills: 0 | Status: SHIPPED | OUTPATIENT
Start: 2020-11-26 | End: 2021-02-03

## 2020-10-26 RX ORDER — METHYLPHENIDATE HYDROCHLORIDE 54 MG/1
54 TABLET ORAL DAILY
Qty: 30 TABLET | Refills: 0 | Status: SHIPPED | OUTPATIENT
Start: 2020-12-26 | End: 2021-01-26

## 2020-10-26 RX ORDER — METHYLPHENIDATE HYDROCHLORIDE 54 MG/1
54 TABLET ORAL DAILY
Qty: 30 TABLET | Refills: 0 | Status: SHIPPED | OUTPATIENT
Start: 2020-10-26 | End: 2021-08-12

## 2020-10-26 NOTE — TELEPHONE ENCOUNTER
Routing refill request to provider for review/approval because:  Drug not on the [facility] refill protocol     Hui Mcqueen RN

## 2020-11-22 ENCOUNTER — HEALTH MAINTENANCE LETTER (OUTPATIENT)
Age: 20
End: 2020-11-22

## 2021-01-26 DIAGNOSIS — F90.2 ATTENTION DEFICIT HYPERACTIVITY DISORDER (ADHD), COMBINED TYPE: ICD-10-CM

## 2021-01-26 RX ORDER — METHYLPHENIDATE HYDROCHLORIDE 54 MG/1
54 TABLET ORAL DAILY
Qty: 30 TABLET | Refills: 0 | Status: SHIPPED | OUTPATIENT
Start: 2021-01-26 | End: 2021-08-12

## 2021-02-02 NOTE — TELEPHONE ENCOUNTER
See 7/23 refill encounter.    Viktoriya Craig RN BSN   [facility]     Muscle Hinge Flap Text: The defect edges were debeveled with a #15 scalpel blade.  Given the size, depth and location of the defect and the proximity to free margins a muscle hinge flap was deemed most appropriate.  Using a sterile surgical marker, an appropriate hinge flap was drawn incorporating the defect. The area thus outlined was incised with a #15 scalpel blade.  The skin margins were undermined to an appropriate distance in all directions utilizing iris scissors.

## 2021-02-03 ENCOUNTER — OFFICE VISIT (OUTPATIENT)
Dept: PEDIATRICS | Facility: CLINIC | Age: 21
End: 2021-02-03
Payer: COMMERCIAL

## 2021-02-03 VITALS
OXYGEN SATURATION: 100 % | SYSTOLIC BLOOD PRESSURE: 122 MMHG | DIASTOLIC BLOOD PRESSURE: 74 MMHG | HEIGHT: 63 IN | RESPIRATION RATE: 18 BRPM | BODY MASS INDEX: 24.22 KG/M2 | HEART RATE: 69 BPM | TEMPERATURE: 98.8 F | WEIGHT: 136.7 LBS

## 2021-02-03 DIAGNOSIS — G44.209 TENSION HEADACHE: ICD-10-CM

## 2021-02-03 DIAGNOSIS — F41.9 ANXIETY: ICD-10-CM

## 2021-02-03 DIAGNOSIS — H93.13 TINNITUS, BILATERAL: ICD-10-CM

## 2021-02-03 DIAGNOSIS — K46.9 NON-RECURRENT ABDOMINAL HERNIA WITHOUT OBSTRUCTION OR GANGRENE, UNSPECIFIED HERNIA TYPE: ICD-10-CM

## 2021-02-03 DIAGNOSIS — F39 MOOD DISORDER (H): ICD-10-CM

## 2021-02-03 DIAGNOSIS — Z00.01 ENCOUNTER FOR GENERAL ADULT MEDICAL EXAMINATION WITH ABNORMAL FINDINGS: Primary | ICD-10-CM

## 2021-02-03 DIAGNOSIS — K51.80 OTHER ULCERATIVE COLITIS WITHOUT COMPLICATION (H): ICD-10-CM

## 2021-02-03 DIAGNOSIS — F90.2 ATTENTION DEFICIT HYPERACTIVITY DISORDER (ADHD), COMBINED TYPE: ICD-10-CM

## 2021-02-03 PROCEDURE — 96127 BRIEF EMOTIONAL/BEHAV ASSMT: CPT | Performed by: PEDIATRICS

## 2021-02-03 PROCEDURE — 99395 PREV VISIT EST AGE 18-39: CPT | Performed by: PEDIATRICS

## 2021-02-03 PROCEDURE — 99213 OFFICE O/P EST LOW 20 MIN: CPT | Mod: 25 | Performed by: PEDIATRICS

## 2021-02-03 RX ORDER — METHYLPHENIDATE HYDROCHLORIDE 54 MG/1
54 TABLET ORAL DAILY
Qty: 30 TABLET | Refills: 0 | Status: SHIPPED | OUTPATIENT
Start: 2021-02-24 | End: 2021-08-12

## 2021-02-03 RX ORDER — METHYLPHENIDATE HYDROCHLORIDE 54 MG/1
54 TABLET ORAL DAILY
Qty: 30 TABLET | Refills: 0 | Status: SHIPPED | OUTPATIENT
Start: 2021-03-24 | End: 2021-08-12

## 2021-02-03 RX ORDER — SERTRALINE HYDROCHLORIDE 100 MG/1
200 TABLET, FILM COATED ORAL DAILY
Qty: 180 TABLET | Refills: 1 | Status: SHIPPED | OUTPATIENT
Start: 2021-02-03 | End: 2021-08-12

## 2021-02-03 RX ORDER — METHYLPHENIDATE HYDROCHLORIDE 54 MG/1
54 TABLET ORAL DAILY
Qty: 30 TABLET | Refills: 0 | Status: SHIPPED | OUTPATIENT
Start: 2021-04-24 | End: 2021-08-12

## 2021-02-03 ASSESSMENT — ENCOUNTER SYMPTOMS
ARTHRALGIAS: 0
DIZZINESS: 0
FEVER: 0
HEMATURIA: 0
FREQUENCY: 0
HEARTBURN: 0
CONSTIPATION: 0
ABDOMINAL PAIN: 1
CHILLS: 0
PARESTHESIAS: 0
JOINT SWELLING: 0
COUGH: 0
NAUSEA: 0
PALPITATIONS: 0
NERVOUS/ANXIOUS: 0
WEAKNESS: 0
HEADACHES: 1
MYALGIAS: 1
DIARRHEA: 1
SHORTNESS OF BREATH: 0
HEMATOCHEZIA: 0
NERVOUS/ANXIOUS: 1
SORE THROAT: 0
EYE PAIN: 0
DYSURIA: 0

## 2021-02-03 ASSESSMENT — MIFFLIN-ST. JEOR: SCORE: 1523.2

## 2021-02-03 NOTE — PROGRESS NOTES
SUBJECTIVE:   CC: Chino Jesus is an 20 year old man who presents for preventive health visit.     {Patient has been advised of split billing requirements and indicates understanding: Yes  Anxiety  Associated symptoms include abdominal pain, congestion, headaches and myalgias. Pertinent negatives include no arthralgias, chest pain, chills, coughing, fever, joint swelling, nausea, rash, sore throat or weakness.   Healthy Habits:     Getting at least 3 servings of Calcium per day:  Yes    Bi-annual eye exam:  Yes    Dental care twice a year:  Yes    Sleep apnea or symptoms of sleep apnea:  Daytime drowsiness    Diet:  Regular (no restrictions)    Frequency of exercise:  2-3 days/week    Duration of exercise:  Less than 15 minutes    Taking medications regularly:  Yes    Medication side effects:  None    PHQ-2 Total Score: 0    Additional concerns today:  Yes      Anxiety Follow-Up    How are you doing with your anxiety since your last visit? stable    Are you having other symptoms that might be associated with anxiety? No    Have you had a significant life event? No     Are you feeling depressed? No    Do you have any concerns with your use of alcohol or other drugs? No    Social History     Tobacco Use     Smoking status: Never Smoker     Smokeless tobacco: Never Used   Substance Use Topics     Alcohol use: No     Drug use: No     GENE-7 SCORE 8/2/2019 6/25/2020   Total Score 2 6     PHQ 8/2/2019   PHQ-9 Total Score 2   Q9: Thoughts of better off dead/self-harm past 2 weeks Not at all     GENE-7  6/25/2020   1. Feeling nervous, anxious, or on edge 0   2. Not being able to stop or control worrying 1   3. Worrying too much about different things 1   4. Trouble relaxing 0   5. Being so restless that it is hard to sit still 3   6. Becoming easily annoyed or irritable 1   7. Feeling afraid, as if something awful might happen 0   GENE-7 Total Score 6   If you checked any problems, how difficult have they made it for  you to do your work, take care of things at home, or get along with other people? Somewhat difficult         Today's PHQ-2 Score:   PHQ-2 ( 1999 Pf[employer]) 2/3/2021   Q1: Little interest or pleasure in doing things -   Q2: Feeling down, depressed or hopeless -   PHQ-2 Score -   Q1: Little interest or pleasure in doing things -   Q2: Feeling down, depressed or hopeless -   PHQ-2 Score Incomplete       Abuse: Current or Past (Physical, Sexual or Emotional) - No  Do you feel safe in your environment? Yes        Social History     Tobacco Use     Smoking status: Never Smoker     Smokeless tobacco: Never Used   Substance Use Topics     Alcohol use: No         No flowsheet data found.      Reviewed orders with patient.  Reviewed health maintenance and updated orders accordingly - Yes     Reviewed and updated as needed this visit by clinical staff  Tobacco  Allergies    Med Hx  Surg Hx  Fam Hx  Soc Hx        Reviewed and updated as needed this visit by Provider                    Review of Systems   Constitutional: Negative for chills and fever.   HENT: Positive for congestion and ear pain. Negative for hearing loss and sore throat.    Eyes: Negative for pain and visual disturbance.   Respiratory: Negative for cough and shortness of breath.    Cardiovascular: Negative for chest pain, palpitations and peripheral edema.   Gastrointestinal: Positive for abdominal pain and diarrhea. Negative for constipation, heartburn, hematochezia and nausea.   Genitourinary: Negative for discharge, dysuria, frequency, genital sores, hematuria, impotence and urgency.   Musculoskeletal: Positive for myalgias. Negative for arthralgias and joint swelling.   Skin: Negative for rash.   Neurological: Positive for headaches. Negative for dizziness, weakness and paresthesias.   Psychiatric/Behavioral: Negative for mood changes. The patient is nervous/anxious.           OBJECTIVE:   /74 (BP Location: Right arm, Patient Position: Sitting, Cuff  "Size: Adult Regular)   Pulse 69   Temp 98.8  F (37.1  C) (Tympanic)   Resp 18   Ht 1.605 m (5' 3.19\")   Wt 62 kg (136 lb 11.2 oz)   SpO2 100%   BMI 24.07 kg/m    Physical Exam  GENERAL: healthy, alert and no distress  EYES: Eyes grossly normal to inspection, PERRL and conjunctivae and sclerae normal  HENT: ear canals and TM's normal, nose and mouth without ulcers or lesions  NECK: no adenopathy, no asymmetry, masses, or scars and thyroid normal to palpation  RESP: lungs clear to auscultation - no rales, rhonchi or wheezes  CV: regular rate and rhythm, normal S1 S2, no S3 or S4, no murmur, click or rub, no peripheral edema and peripheral pulses strong  ABDOMEN: soft, nontender, no hepatosplenomegaly, no masses and bowel sounds normal   (male): normal male genitalia without lesions or urethral discharge, no hernia  MS: no gross musculoskeletal defects noted, no edema  SKIN: no suspicious lesions or rashes  NEURO: Normal strength and tone, mentation intact and speech normal  PSYCH: mentation appears normal, affect normal/bright    Diagnostic Test Results:  Labs reviewed in Epic    ASSESSMENT/PLAN:   1. Encounter for general adult medical examination with abnormal findings    2. Mood disorder (H)  Controlled, continue and follow up in 6m  - sertraline (ZOLOFT) 100 MG tablet; Take 2 tablets (200 mg) by mouth daily  Dispense: 180 tablet; Refill: 1    3. Other ulcerative colitis without complication (H)  Managed by GI - off sulfasalazine, remains on dicyclomine and cholestermine for IBS    4. Anxiety  Controlled and follow up in 6m  - sertraline (ZOLOFT) 100 MG tablet; Take 2 tablets (200 mg) by mouth daily  Dispense: 180 tablet; Refill: 1    5. Non-recurrent abdominal hernia without obstruction or gangrene, unspecified hernia type  No findings on exam, but h/o umbilical pain with position changes or lifting heavy objects - seems random at time. No radiation to testes.  >1 years of symptoms. Severe pain when he " "does get it.  Works as  in summer and does ice fishing in winter.  - CT Abdomen Pelvis w Contrast; Future    6. Attention deficit hyperactivity disorder (ADHD), combined type  Controlled, continue and follow up in 6 m  - methylphenidate (CONCERTA) 54 MG CR tablet; Take 1 tablet (54 mg) by mouth daily  Dispense: 30 tablet; Refill: 0  - methylphenidate (CONCERTA) 54 MG CR tablet; Take 1 tablet (54 mg) by mouth daily  Dispense: 30 tablet; Refill: 0  - methylphenidate (CONCERTA) 54 MG CR tablet; Take 1 tablet (54 mg) by mouth daily  Dispense: 30 tablet; Refill: 0    7. Tinnitus, bilateral  Previous evals with ENT - told he had eustacian tube dysfunction, TM normal today - see PI    8. Tension headache  See PI - patient to avoid NSAIDS due to coliits    Patient Instructions   1. Debrox to left ear.    2. You will be called to schedule CT scan.  If you don't hear from someone in 1-2 days, please call radiology department at [phone] to schedule your test at [facility].    3. Atleast one month of flonase daily - 2 puffs each side AND daily claritin.    4. Continue concerta and sertraline.    5. Keep a headache journal: whenever you get a headache think about the following:  --hydration  --last time you ate  --how did you sleep the night before  --stress (is something new happening)  --computer/screen time          Patient has been advised of split billing requirements and indicates understanding: Yes  COUNSELING:  Reviewed preventive health counseling, as reflected in patient instructions    Estimated body mass index is 24.07 kg/m  as calculated from the following:    Height as of this encounter: 1.605 m (5' 3.19\").    Weight as of this encounter: 62 kg (136 lb 11.2 oz).        He reports that he has never smoked. He has never used smokeless tobacco.      Counseling Resources:  ATP IV Guidelines  Pooled Cohorts Equation Calculator  Breast Cancer Risk Calculator  BRCA-Related Cancer Risk Assessment: FHS-7 " Tool  FRAX Risk Assessment  ICSI Preventive Guidelines  Dietary Guidelines for Americans, 2010  USDA's MyPlate  ASA Prophylaxis  Lung CA Screening    Rita Benavides MD  [facility]

## 2021-02-03 NOTE — PATIENT INSTRUCTIONS
1. Debrox to left ear.    2. You will be called to schedule CT scan.  If you don't hear from someone in 1-2 days, please call radiology department at [phone] to schedule your test at [facility].    3. Atleast one month of flonase daily - 2 puffs each side AND daily claritin.    4. Continue concerta and sertraline.    5. Keep a headache journal: whenever you get a headache think about the following:  --hydration  --last time you ate  --how did you sleep the night before  --stress (is something new happening)  --computer/screen time

## 2021-02-05 ASSESSMENT — ANXIETY QUESTIONNAIRES
3. WORRYING TOO MUCH ABOUT DIFFERENT THINGS: SEVERAL DAYS
1. FEELING NERVOUS, ANXIOUS, OR ON EDGE: SEVERAL DAYS
GAD7 TOTAL SCORE: 5
7. FEELING AFRAID AS IF SOMETHING AWFUL MIGHT HAPPEN: NOT AT ALL
IF YOU CHECKED OFF ANY PROBLEMS ON THIS QUESTIONNAIRE, HOW DIFFICULT HAVE THESE PROBLEMS MADE IT FOR YOU TO DO YOUR WORK, TAKE CARE OF THINGS AT HOME, OR GET ALONG WITH OTHER PEOPLE: NOT DIFFICULT AT ALL
5. BEING SO RESTLESS THAT IT IS HARD TO SIT STILL: SEVERAL DAYS
2. NOT BEING ABLE TO STOP OR CONTROL WORRYING: NOT AT ALL
6. BECOMING EASILY ANNOYED OR IRRITABLE: NOT AT ALL

## 2021-02-05 ASSESSMENT — PATIENT HEALTH QUESTIONNAIRE - PHQ9: 5. POOR APPETITE OR OVEREATING: MORE THAN HALF THE DAYS

## 2021-02-06 ASSESSMENT — ANXIETY QUESTIONNAIRES: GAD7 TOTAL SCORE: 5

## 2021-02-09 ENCOUNTER — TELEPHONE (OUTPATIENT)
Dept: PEDIATRICS | Facility: CLINIC | Age: 21
End: 2021-02-09

## 2021-02-09 NOTE — TELEPHONE ENCOUNTER
"I reviewed patient's chart; patient was seen by Dr. Benavides on 02/03/2021 and CT abdomen with contrast was ordered per OV note:    \"5. Non-recurrent abdominal hernia without obstruction or gangrene, unspecified hernia type  No findings on exam, but h/o umbilical pain with position changes or lifting heavy objects - seems random at time. No radiation to testes.  >1 years of symptoms. Severe pain when he does get it.  Works as  in summer and does ice fishing in winter.  - CT Abdomen Pelvis w Contrast; Future\"    I spoke to mother and let her know that we will forward request for insurance prior authorization to correct pool/person.    CHELITA PARADA MA on 2/9/2021 at 5:30 PM        "

## 2021-02-09 NOTE — TELEPHONE ENCOUNTER
Reason for call:  Other   Patient called regarding (reason for call): call back    Additional comments: per patient mother , called [employer] and they stated that she needs a prior auth for the CT.     Phone number to reach patient:  Home number on file [phone] (home)    Best Time:  Anytime     Can we leave a detailed message on this number?  NO    Travel screening: Not Applicable

## 2021-02-11 NOTE — TELEPHONE ENCOUNTER
This will be facilitated by the hospitals financial clearance department.  Not referrals.    Randa-Referrals

## 2021-02-11 NOTE — TELEPHONE ENCOUNTER
Mother given contact info for hospital billing department.    CHELITA PARADA MA on 2/11/2021 at 9:17 AM

## 2021-02-17 ENCOUNTER — HOSPITAL ENCOUNTER (OUTPATIENT)
Dept: CT IMAGING | Facility: CLINIC | Age: 21
Discharge: HOME | End: 2021-02-17
Attending: PEDIATRICS | Admitting: PEDIATRICS
Payer: COMMERCIAL

## 2021-02-17 DIAGNOSIS — K46.9 NON-RECURRENT ABDOMINAL HERNIA WITHOUT OBSTRUCTION OR GANGRENE, UNSPECIFIED HERNIA TYPE: ICD-10-CM

## 2021-02-17 PROCEDURE — 250N000009 HC RX 250: Performed by: PEDIATRICS

## 2021-02-17 PROCEDURE — 74177 CT ABD & PELVIS W/CONTRAST: CPT

## 2021-02-17 PROCEDURE — 250N000011 HC RX IP 250 OP 636: Performed by: PEDIATRICS

## 2021-02-17 RX ORDER — IOPAMIDOL 755 MG/ML
57 INJECTION, SOLUTION INTRAVASCULAR ONCE
Status: COMPLETED | OUTPATIENT
Start: 2021-02-17 | End: 2021-02-17

## 2021-02-17 RX ADMIN — SODIUM CHLORIDE 59 ML: 9 INJECTION, SOLUTION INTRAVENOUS at 10:28

## 2021-02-17 RX ADMIN — IOPAMIDOL 57 ML: 755 INJECTION, SOLUTION INTRAVENOUS at 10:28

## 2021-05-26 DIAGNOSIS — F90.2 ATTENTION DEFICIT HYPERACTIVITY DISORDER (ADHD), COMBINED TYPE: ICD-10-CM

## 2021-05-26 RX ORDER — METHYLPHENIDATE HYDROCHLORIDE 54 MG/1
54 TABLET ORAL DAILY
Qty: 30 TABLET | Refills: 0 | Status: CANCELLED | OUTPATIENT
Start: 2021-05-26

## 2021-05-26 RX ORDER — METHYLPHENIDATE HYDROCHLORIDE 54 MG/1
54 TABLET ORAL DAILY
Qty: 30 TABLET | Refills: 0 | Status: SHIPPED | OUTPATIENT
Start: 2021-05-26 | End: 2021-08-12

## 2021-05-26 RX ORDER — METHYLPHENIDATE HYDROCHLORIDE 54 MG/1
54 TABLET ORAL DAILY
Qty: 30 TABLET | Refills: 0 | Status: SHIPPED | OUTPATIENT
Start: 2021-07-27 | End: 2022-02-24

## 2021-05-26 RX ORDER — METHYLPHENIDATE HYDROCHLORIDE 54 MG/1
54 TABLET ORAL DAILY
Qty: 30 TABLET | Refills: 0 | Status: SHIPPED | OUTPATIENT
Start: 2021-06-26 | End: 2021-08-12

## 2021-08-12 ENCOUNTER — OFFICE VISIT (OUTPATIENT)
Dept: PEDIATRICS | Facility: CLINIC | Age: 21
End: 2021-08-12
Payer: COMMERCIAL

## 2021-08-12 VITALS
BODY MASS INDEX: 25.46 KG/M2 | OXYGEN SATURATION: 96 % | RESPIRATION RATE: 16 BRPM | WEIGHT: 143.7 LBS | HEIGHT: 63 IN | DIASTOLIC BLOOD PRESSURE: 68 MMHG | SYSTOLIC BLOOD PRESSURE: 112 MMHG | TEMPERATURE: 98.6 F | HEART RATE: 69 BPM

## 2021-08-12 DIAGNOSIS — Z72.820 POOR SLEEP: ICD-10-CM

## 2021-08-12 DIAGNOSIS — F39 MOOD DISORDER (H): Primary | ICD-10-CM

## 2021-08-12 DIAGNOSIS — F41.9 ANXIETY: ICD-10-CM

## 2021-08-12 DIAGNOSIS — F90.2 ATTENTION DEFICIT HYPERACTIVITY DISORDER (ADHD), COMBINED TYPE: ICD-10-CM

## 2021-08-12 PROCEDURE — 99214 OFFICE O/P EST MOD 30 MIN: CPT | Performed by: PEDIATRICS

## 2021-08-12 RX ORDER — SERTRALINE HYDROCHLORIDE 100 MG/1
200 TABLET, FILM COATED ORAL DAILY
Qty: 180 TABLET | Refills: 1 | Status: SHIPPED | OUTPATIENT
Start: 2021-08-12 | End: 2022-02-28

## 2021-08-12 RX ORDER — METHYLPHENIDATE HYDROCHLORIDE 54 MG/1
54 TABLET ORAL DAILY
Qty: 30 TABLET | Refills: 0 | Status: SHIPPED | OUTPATIENT
Start: 2021-09-12 | End: 2021-11-23

## 2021-08-12 RX ORDER — METHYLPHENIDATE HYDROCHLORIDE 54 MG/1
54 TABLET ORAL DAILY
Qty: 30 TABLET | Refills: 0 | Status: SHIPPED | OUTPATIENT
Start: 2021-08-12 | End: 2021-11-23

## 2021-08-12 RX ORDER — METHYLPHENIDATE HYDROCHLORIDE 54 MG/1
54 TABLET ORAL DAILY
Qty: 30 TABLET | Refills: 0 | Status: SHIPPED | OUTPATIENT
Start: 2021-10-13 | End: 2021-11-23

## 2021-08-12 ASSESSMENT — MIFFLIN-ST. JEOR: SCORE: 1554.96

## 2021-08-12 NOTE — PROGRESS NOTES
"    Assessment & Plan     Mood disorder (H)  Stable - continue and refill  - sertraline (ZOLOFT) 100 MG tablet; Take 2 tablets (200 mg) by mouth daily    Attention deficit hyperactivity disorder (ADHD), combined type  Stable - continue and refill  - methylphenidate (CONCERTA) 54 MG CR tablet; Take 1 tablet (54 mg) by mouth daily  - methylphenidate (CONCERTA) 54 MG CR tablet; Take 1 tablet (54 mg) by mouth daily  - methylphenidate (CONCERTA) 54 MG CR tablet; Take 1 tablet (54 mg) by mouth daily    Anxiety  Stable - continue and refill  - sertraline (ZOLOFT) 100 MG tablet; Take 2 tablets (200 mg) by mouth daily    Poor sleep  See sleep hygeine.  Mom also noting tired during the day and hard to get his attention away from his phone - they often have to ask him several times before he will listen to them.  Only with phone.  Patient states he hears the request, then gets distracted by phone.  No seizure like activity or confusion.  Will try improving sleep and communication to put phone down before starting conversation.     Ok to increase melatonin to 10mg             BMI:   Estimated body mass index is 25.3 kg/m  as calculated from the following:    Height as of this encounter: 1.605 m (5' 3.19\").    Weight as of this encounter: 65.2 kg (143 lb 11.2 oz).       Patient Instructions   If you want to wean the sertraline in the future I would recommend decreasing to 150mg.    For sleep - please see below - as first steps - decrease water before bed and no phone atleast 1 hour prior to bed.      Patient Education     Tips for Sleep Hygiene  \"Sleep hygiene\" means having good sleep habits.Follow these tips to sleep better at night:     Get on a schedule. Go to bed and get up at about the same time every day.    Listen to your body. Only try to sleep when you actually feel tired or sleepy.    Be patient. If you haven't been able to get to sleep after about 30 minutes or more, get up and do something calming or boring until you " "feel sleepy. Then return to bed and try again.    Don't have caffeine (coffee, tea, cola drinks, chocolate and some medicines), alcohol or nicotine (cigarettes). These can make it harder for you to fall asleep and stay asleep.    Use your bed for sleeping only. That means no TV, computer or homework in bed, especially during the evening and before bedtime.    Don't nap during the day. If you must nap, make sure it is for less than 20 minutes.    Create sleep rituals that remind your body it is time to sleep. Examples include breathing exercises, stretching or reading a book.    Avoid all electronic media (smart phone, computer, tablet) within 2 hours of bed time. The \"blue light\" in these devices activates the part of the brain that keeps you awake.    Dim the lights at night.    Get early morning sources of light (walk in the sunshine) to help set sleep patterns at night.    Try a bath or shower before bed. Having a warm bath 1 to 2 hours before bedtime can help you feel sleepy. Hot baths can make you alert, so be mindful of the temperature.    Don't watch the clock. Checking the clock during the night can wake you up. It can also lead to negative thoughts such as, \"I will never fall asleep,\" which can increase anxiety and sleeplessness.    Use a sleep diary. Track your sleep schedule to know your sleep patterns and to see where you can improve.    Get regular exercise every day. Try not to do heavy exercise in the 4 hours before bedtime.    Eat a healthy, balanced diet.    Try eating a light, healthy snack before bed, but avoid eating a heavy meal.    Create the right sleeping area. A cool, dark, quiet room is best. If needed, try earplugs, fans and blackout curtains.    Keep your daytime routine the same even if you have a bad night sleep. Avoiding activities the next day can make it harder to sleep.  For informational purposes only. Not to replace the advice of your health care provider.   Copyright   2013 " "[facility]. All rights reserved. [employer] 662300 - 01/16.               Return in about 6 months (around 2/12/2022) for Routine preventive, with me, in person.    Rita Benavides MD  [facility] [place] Magana is a 20 year old who presents for the following health issues  accompanied by his mother:    HPI     Medication Followup     Taking Medication as prescribed: yes    Side Effects:  Tired in the am    Medication Helping Symptoms:  yes     Last week caught 54 inch muskee. Going to [place] later this month for graduation present.     Sleep - wakes 3-4 times, sometimes for bathroom, others times just awake.  Light sleeping sometimes - feels off and on. Sometimes hard to go back to sleep.     Takes melatonin 6mg before bed.  Sometimes would like to take more in the middle of the night. Falls asleep at 1100 - takes about an hour to get to bed/routine. Drinks a of water prior to bed. Wakes up at 9am.         Review of Systems         Objective    /68 (BP Location: Right arm, Patient Position: Sitting, Cuff Size: Adult Regular)   Pulse 69   Temp 98.6  F (37  C) (Tympanic)   Resp 16   Ht 1.605 m (5' 3.19\")   Wt 65.2 kg (143 lb 11.2 oz)   SpO2 96%   BMI 25.30 kg/m    Body mass index is 25.3 kg/m .  Physical Exam                   "

## 2021-08-12 NOTE — PATIENT INSTRUCTIONS
"If you want to wean the sertraline in the future I would recommend decreasing to 150mg.    For sleep - please see below - as first steps - decrease water before bed and no phone atleast 1 hour prior to bed.      Patient Education     Tips for Sleep Hygiene  \"Sleep hygiene\" means having good sleep habits.Follow these tips to sleep better at night:     Get on a schedule. Go to bed and get up at about the same time every day.    Listen to your body. Only try to sleep when you actually feel tired or sleepy.    Be patient. If you haven't been able to get to sleep after about 30 minutes or more, get up and do something calming or boring until you feel sleepy. Then return to bed and try again.    Don't have caffeine (coffee, tea, cola drinks, chocolate and some medicines), alcohol or nicotine (cigarettes). These can make it harder for you to fall asleep and stay asleep.    Use your bed for sleeping only. That means no TV, computer or homework in bed, especially during the evening and before bedtime.    Don't nap during the day. If you must nap, make sure it is for less than 20 minutes.    Create sleep rituals that remind your body it is time to sleep. Examples include breathing exercises, stretching or reading a book.    Avoid all electronic media (smart phone, computer, tablet) within 2 hours of bed time. The \"blue light\" in these devices activates the part of the brain that keeps you awake.    Dim the lights at night.    Get early morning sources of light (walk in the sunshine) to help set sleep patterns at night.    Try a bath or shower before bed. Having a warm bath 1 to 2 hours before bedtime can help you feel sleepy. Hot baths can make you alert, so be mindful of the temperature.    Don't watch the clock. Checking the clock during the night can wake you up. It can also lead to negative thoughts such as, \"I will never fall asleep,\" which can increase anxiety and sleeplessness.    Use a sleep diary. Track your sleep " schedule to know your sleep patterns and to see where you can improve.    Get regular exercise every day. Try not to do heavy exercise in the 4 hours before bedtime.    Eat a healthy, balanced diet.    Try eating a light, healthy snack before bed, but avoid eating a heavy meal.    Create the right sleeping area. A cool, dark, quiet room is best. If needed, try earplugs, fans and blackout curtains.    Keep your daytime routine the same even if you have a bad night sleep. Avoiding activities the next day can make it harder to sleep.  For informational purposes only. Not to replace the advice of your health care provider.   Copyright   2013 [facility]. All rights reserved. [employer] [id number] - 01/16.

## 2021-08-19 DIAGNOSIS — F90.2 ATTENTION DEFICIT HYPERACTIVITY DISORDER (ADHD), COMBINED TYPE: ICD-10-CM

## 2021-08-19 RX ORDER — METHYLPHENIDATE HYDROCHLORIDE 54 MG/1
54 TABLET ORAL DAILY
Qty: 30 TABLET | Refills: 0 | OUTPATIENT
Start: 2021-08-19

## 2021-08-19 NOTE — TELEPHONE ENCOUNTER
Patient is leaving on vacation on Sunday and needs this filled asap. He wants to pick it up today or tomorrow. He would like a 30 day supply.

## 2021-09-18 ENCOUNTER — HEALTH MAINTENANCE LETTER (OUTPATIENT)
Age: 21
End: 2021-09-18

## 2021-11-23 DIAGNOSIS — F90.2 ATTENTION DEFICIT HYPERACTIVITY DISORDER (ADHD), COMBINED TYPE: ICD-10-CM

## 2021-11-23 RX ORDER — METHYLPHENIDATE HYDROCHLORIDE 54 MG/1
54 TABLET ORAL DAILY
Qty: 30 TABLET | Refills: 0 | Status: SHIPPED | OUTPATIENT
Start: 2022-01-23 | End: 2023-02-22

## 2021-11-23 RX ORDER — METHYLPHENIDATE HYDROCHLORIDE 54 MG/1
54 TABLET ORAL DAILY
Qty: 30 TABLET | Refills: 0 | Status: SHIPPED | OUTPATIENT
Start: 2021-12-23 | End: 2023-02-22

## 2021-11-23 RX ORDER — METHYLPHENIDATE HYDROCHLORIDE 54 MG/1
54 TABLET ORAL DAILY
Qty: 30 TABLET | Refills: 0 | Status: SHIPPED | OUTPATIENT
Start: 2021-11-23 | End: 2023-02-22

## 2021-12-06 ENCOUNTER — TRANSFERRED RECORDS (OUTPATIENT)
Dept: HEALTH INFORMATION MANAGEMENT | Facility: CLINIC | Age: 21
End: 2021-12-06
Payer: COMMERCIAL

## 2022-02-24 DIAGNOSIS — F90.2 ATTENTION DEFICIT HYPERACTIVITY DISORDER (ADHD), COMBINED TYPE: ICD-10-CM

## 2022-02-24 RX ORDER — METHYLPHENIDATE HYDROCHLORIDE 54 MG/1
54 TABLET ORAL DAILY
Qty: 30 TABLET | Refills: 0 | Status: SHIPPED | OUTPATIENT
Start: 2022-02-24 | End: 2022-03-31

## 2022-02-28 ENCOUNTER — VIRTUAL VISIT (OUTPATIENT)
Dept: PEDIATRICS | Facility: CLINIC | Age: 22
End: 2022-02-28
Payer: COMMERCIAL

## 2022-02-28 DIAGNOSIS — F39 MOOD DISORDER (H): ICD-10-CM

## 2022-02-28 DIAGNOSIS — F41.9 ANXIETY: ICD-10-CM

## 2022-02-28 DIAGNOSIS — F90.2 ATTENTION DEFICIT HYPERACTIVITY DISORDER (ADHD), COMBINED TYPE: Primary | ICD-10-CM

## 2022-02-28 PROCEDURE — 99214 OFFICE O/P EST MOD 30 MIN: CPT | Mod: 95 | Performed by: PEDIATRICS

## 2022-02-28 RX ORDER — SERTRALINE HYDROCHLORIDE 100 MG/1
200 TABLET, FILM COATED ORAL DAILY
Qty: 180 TABLET | Refills: 1 | Status: SHIPPED | OUTPATIENT
Start: 2022-02-28 | End: 2022-08-10

## 2022-02-28 RX ORDER — METHYLPHENIDATE HYDROCHLORIDE 36 MG/1
36 TABLET ORAL EVERY MORNING
Qty: 30 TABLET | Refills: 0 | Status: SHIPPED | OUTPATIENT
Start: 2022-02-28 | End: 2022-03-10

## 2022-02-28 NOTE — PATIENT INSTRUCTIONS
"Dear Chino,    We will keep the sertraline dose the same.    Let's try going down on the Concerta to 36mg.    Please watch for:    --is the morning \"dip\" in energy any better?  --how do you function the rest of the day? Are you able to pay attention and complete tasks at work?    We will follow up in 4 weeks.    Rita Benavides MD  Internal Medicine/Pediatrics  [facility]      "
stated

## 2022-02-28 NOTE — PROGRESS NOTES
"Chino is a 21 year old who is being evaluated via a billable video visit.      How would you like to obtain your AVS? Mail a copy  If the video visit is dropped, the invitation should be resent by: epic  Will anyone else be joining your video visit? mom      Video Start Time: 1:50pm    Assessment & Plan     Attention deficit hyperactivity disorder (ADHD), combined type  Getting a lull in the morning, would like to try a lower dose.  See PI.   - methylphenidate (CONCERTA) 36 MG CR tablet; Take 1 tablet (36 mg) by mouth every morning    Mood disorder (H)  Stable.  - sertraline (ZOLOFT) 100 MG tablet; Take 2 tablets (200 mg) by mouth daily    Anxiety  stable  - sertraline (ZOLOFT) 100 MG tablet; Take 2 tablets (200 mg) by mouth daily             BMI:   Estimated body mass index is 25.3 kg/m  as calculated from the following:    Height as of 8/12/21: 1.605 m (5' 3.19\").    Weight as of 8/12/21: 65.2 kg (143 lb 11.2 oz).       Patient Instructions   Dear Chino,    We will keep the sertraline dose the same.    Let's try going down on the Concerta to 36mg.    Please watch for:    --is the morning \"dip\" in energy any better?  --how do you function the rest of the day? Are you able to pay attention and complete tasks at work?    We will follow up in 4 weeks.    Rita Benavides MD  Internal Medicine/Pediatrics  [facility]          Return in about 4 weeks (around 3/28/2022) for Follow up, with me, using a video visit, ADHD.    Rita Benavides MD  [facility] [place] Magana is a 21 year old who presents for the following health issues with mom    History of Present Illness     Reason for visit:  Medication check  Symptoms include:  None    He eats 2-3 servings of fruits and vegetables daily.He consumes 1 sweetened beverage(s) daily.He exercises with enough effort to increase his heart rate 20 to 29 minutes per day.  He exercises with enough effort to increase his heart rate 3 or less " days per week.   He is taking medications regularly.     Last visit 8/21 for med check:    Last few nights have been stressful - patient was adopted from [place] and family has friends in [place] and [place].     Anxiety - on sertraline 200mg - still feels this is a good dose.    Sleep - sleeping in later, overall the same.  Patient is taking melatonin 10mg before bed.  Still gets up at night.     ADHD Concerta 54mg - when he takes in AM has lots of energy - then he gets tired right after he takes    Will be doing [employer] work at [employer].               Review of Systems         Objective           Vitals:  No vitals were obtained today due to virtual visit.    Physical Exam   GENERAL: Healthy, alert and no distress  EYES: Eyes grossly normal to inspection.  No discharge or erythema, or obvious scleral/conjunctival abnormalities.  RESP: No audible wheeze, cough, or visible cyanosis.  No visible retractions or increased work of breathing.    SKIN: Visible skin clear. No significant rash, abnormal pigmentation or lesions.  NEURO: Cranial nerves grossly intact.  Mentation and speech appropriate for age.  PSYCH: Mentation appears normal, affect normal/bright, judgement and insight intact, normal speech and appearance well-groomed.                Video-Visit Details    Type of service:  Video Visit    Video End Time:2:03pm    Originating Location (pt. Location): Home    Distant Location (provider location):  [facility] [place]     Platform used for Video Visit: Translimit

## 2022-03-05 ENCOUNTER — HEALTH MAINTENANCE LETTER (OUTPATIENT)
Age: 22
End: 2022-03-05

## 2022-03-31 ENCOUNTER — MYC REFILL (OUTPATIENT)
Dept: PEDIATRICS | Facility: CLINIC | Age: 22
End: 2022-03-31
Payer: COMMERCIAL

## 2022-03-31 DIAGNOSIS — F90.2 ATTENTION DEFICIT HYPERACTIVITY DISORDER (ADHD), COMBINED TYPE: ICD-10-CM

## 2022-04-01 RX ORDER — METHYLPHENIDATE HYDROCHLORIDE 54 MG/1
54 TABLET ORAL DAILY
Qty: 30 TABLET | Refills: 0 | Status: SHIPPED | OUTPATIENT
Start: 2022-04-01 | End: 2023-02-22

## 2022-05-01 ENCOUNTER — MYC REFILL (OUTPATIENT)
Dept: PEDIATRICS | Facility: CLINIC | Age: 22
End: 2022-05-01
Payer: COMMERCIAL

## 2022-05-01 DIAGNOSIS — F90.2 ATTENTION DEFICIT HYPERACTIVITY DISORDER (ADHD), COMBINED TYPE: ICD-10-CM

## 2022-05-01 RX ORDER — METHYLPHENIDATE HYDROCHLORIDE 54 MG/1
54 TABLET ORAL DAILY
Qty: 30 TABLET | Refills: 0 | Status: CANCELLED | OUTPATIENT
Start: 2022-05-01

## 2022-05-04 RX ORDER — METHYLPHENIDATE HYDROCHLORIDE 54 MG/1
54 TABLET ORAL DAILY
Qty: 30 TABLET | Refills: 0 | Status: SHIPPED | OUTPATIENT
Start: 2022-06-04 | End: 2022-08-10

## 2022-05-04 RX ORDER — METHYLPHENIDATE HYDROCHLORIDE 54 MG/1
54 TABLET ORAL DAILY
Qty: 30 TABLET | Refills: 0 | Status: SHIPPED | OUTPATIENT
Start: 2022-07-05 | End: 2022-08-10

## 2022-05-04 RX ORDER — METHYLPHENIDATE HYDROCHLORIDE 54 MG/1
54 TABLET ORAL DAILY
Qty: 30 TABLET | Refills: 0 | Status: SHIPPED | OUTPATIENT
Start: 2022-05-04 | End: 2022-08-10

## 2022-05-04 NOTE — TELEPHONE ENCOUNTER
Routing refill request to provider for review/approval because:  Drug not on the [facility] refill protocol     Dany BYNUM RN

## 2022-05-29 ENCOUNTER — MYC MEDICAL ADVICE (OUTPATIENT)
Dept: PEDIATRICS | Facility: CLINIC | Age: 22
End: 2022-05-29
Payer: COMMERCIAL

## 2022-05-31 ENCOUNTER — E-VISIT (OUTPATIENT)
Dept: PEDIATRICS | Facility: CLINIC | Age: 22
End: 2022-05-31
Payer: COMMERCIAL
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
## 2022-05-31 DIAGNOSIS — S80.869A TICK BITE OF LOWER LEG, UNSPECIFIED LATERALITY, INITIAL ENCOUNTER: Primary | ICD-10-CM

## 2022-05-31 PROCEDURE — 99421 OL DIG E/M SVC 5-10 MIN: CPT | Performed by: PEDIATRICS

## 2022-06-01 RX ORDER — DOXYCYCLINE 100 MG/1
200 CAPSULE ORAL ONCE
Qty: 2 CAPSULE | Refills: 0 | Status: SHIPPED | OUTPATIENT
Start: 2022-06-01 | End: 2022-06-01

## 2022-06-01 NOTE — PATIENT INSTRUCTIONS
Dear Chino,    Given the high rates of Lyme in [place], I recommend we treat you with a prophylactic dose of doxycycline.  I have sent this to your pharmacy.  Please take this as soon as possible.       Rita Benavides MD  Internal Medicine/Pediatrics  [facility]

## 2022-06-01 NOTE — TELEPHONE ENCOUNTER
Dr. Benavides-  Please see  message and pictures to go along with Evisit submitted last night. Melisa Woodard RN on 6/1/2022 at 11:42 AM

## 2022-07-27 ENCOUNTER — MYC REFILL (OUTPATIENT)
Dept: PEDIATRICS | Facility: CLINIC | Age: 22
End: 2022-07-27

## 2022-07-27 DIAGNOSIS — F90.2 ATTENTION DEFICIT HYPERACTIVITY DISORDER (ADHD), COMBINED TYPE: ICD-10-CM

## 2022-07-27 RX ORDER — METHYLPHENIDATE HYDROCHLORIDE 54 MG/1
54 TABLET ORAL DAILY
Qty: 30 TABLET | Refills: 0 | Status: CANCELLED | OUTPATIENT
Start: 2022-07-27

## 2022-07-27 RX ORDER — METHYLPHENIDATE HYDROCHLORIDE 54 MG/1
54 TABLET ORAL EVERY MORNING
Qty: 30 TABLET | Refills: 0 | Status: SHIPPED | OUTPATIENT
Start: 2022-09-27 | End: 2023-02-22

## 2022-07-27 RX ORDER — METHYLPHENIDATE HYDROCHLORIDE 54 MG/1
54 TABLET ORAL EVERY MORNING
Qty: 30 TABLET | Refills: 0 | Status: SHIPPED | OUTPATIENT
Start: 2022-08-27 | End: 2023-02-22

## 2022-07-27 RX ORDER — METHYLPHENIDATE HYDROCHLORIDE 54 MG/1
54 TABLET ORAL EVERY MORNING
Qty: 30 TABLET | Refills: 0 | Status: SHIPPED | OUTPATIENT
Start: 2022-07-27 | End: 2023-02-22

## 2022-08-10 ENCOUNTER — OFFICE VISIT (OUTPATIENT)
Dept: PEDIATRICS | Facility: CLINIC | Age: 22
End: 2022-08-10
Payer: COMMERCIAL

## 2022-08-10 VITALS
SYSTOLIC BLOOD PRESSURE: 106 MMHG | HEART RATE: 60 BPM | TEMPERATURE: 98.1 F | BODY MASS INDEX: 25.23 KG/M2 | HEIGHT: 63 IN | RESPIRATION RATE: 16 BRPM | DIASTOLIC BLOOD PRESSURE: 72 MMHG | WEIGHT: 142.4 LBS | OXYGEN SATURATION: 100 %

## 2022-08-10 DIAGNOSIS — Z72.820 POOR SLEEP: ICD-10-CM

## 2022-08-10 DIAGNOSIS — F90.2 ATTENTION DEFICIT HYPERACTIVITY DISORDER (ADHD), COMBINED TYPE: ICD-10-CM

## 2022-08-10 DIAGNOSIS — Z00.01 ENCOUNTER FOR GENERAL ADULT MEDICAL EXAMINATION WITH ABNORMAL FINDINGS: Primary | ICD-10-CM

## 2022-08-10 DIAGNOSIS — F41.9 ANXIETY: ICD-10-CM

## 2022-08-10 DIAGNOSIS — F84.0 ACTIVE AUTISTIC DISORDER: ICD-10-CM

## 2022-08-10 DIAGNOSIS — F39 MOOD DISORDER (H): ICD-10-CM

## 2022-08-10 PROCEDURE — 96127 BRIEF EMOTIONAL/BEHAV ASSMT: CPT | Performed by: PEDIATRICS

## 2022-08-10 PROCEDURE — 99214 OFFICE O/P EST MOD 30 MIN: CPT | Mod: 25 | Performed by: PEDIATRICS

## 2022-08-10 PROCEDURE — 99395 PREV VISIT EST AGE 18-39: CPT | Performed by: PEDIATRICS

## 2022-08-10 RX ORDER — SERTRALINE HYDROCHLORIDE 100 MG/1
200 TABLET, FILM COATED ORAL DAILY
Qty: 180 TABLET | Refills: 1 | Status: SHIPPED | OUTPATIENT
Start: 2022-08-10 | End: 2023-02-22

## 2022-08-10 SDOH — ECONOMIC STABILITY: FOOD INSECURITY: WITHIN THE PAST 12 MONTHS, YOU WORRIED THAT YOUR FOOD WOULD RUN OUT BEFORE YOU GOT MONEY TO BUY MORE.: NEVER TRUE

## 2022-08-10 SDOH — ECONOMIC STABILITY: TRANSPORTATION INSECURITY
IN THE PAST 12 MONTHS, HAS THE LACK OF TRANSPORTATION KEPT YOU FROM MEDICAL APPOINTMENTS OR FROM GETTING MEDICATIONS?: NO

## 2022-08-10 SDOH — ECONOMIC STABILITY: INCOME INSECURITY: HOW HARD IS IT FOR YOU TO PAY FOR THE VERY BASICS LIKE FOOD, HOUSING, MEDICAL CARE, AND HEATING?: NOT HARD AT ALL

## 2022-08-10 SDOH — ECONOMIC STABILITY: FOOD INSECURITY: WITHIN THE PAST 12 MONTHS, THE FOOD YOU BOUGHT JUST DIDN'T LAST AND YOU DIDN'T HAVE MONEY TO GET MORE.: NEVER TRUE

## 2022-08-10 SDOH — ECONOMIC STABILITY: TRANSPORTATION INSECURITY
IN THE PAST 12 MONTHS, HAS LACK OF TRANSPORTATION KEPT YOU FROM MEETINGS, WORK, OR FROM GETTING THINGS NEEDED FOR DAILY LIVING?: NO

## 2022-08-10 SDOH — ECONOMIC STABILITY: INCOME INSECURITY: IN THE LAST 12 MONTHS, WAS THERE A TIME WHEN YOU WERE NOT ABLE TO PAY THE MORTGAGE OR RENT ON TIME?: NO

## 2022-08-10 SDOH — HEALTH STABILITY: PHYSICAL HEALTH: ON AVERAGE, HOW MANY DAYS PER WEEK DO YOU ENGAGE IN MODERATE TO STRENUOUS EXERCISE (LIKE A BRISK WALK)?: 4 DAYS

## 2022-08-10 SDOH — HEALTH STABILITY: PHYSICAL HEALTH: ON AVERAGE, HOW MANY MINUTES DO YOU ENGAGE IN EXERCISE AT THIS LEVEL?: 150+ MIN

## 2022-08-10 ASSESSMENT — ENCOUNTER SYMPTOMS
DYSURIA: 0
ABDOMINAL PAIN: 1
NAUSEA: 0
DIZZINESS: 0
ARTHRALGIAS: 1
WEAKNESS: 0
FEVER: 0
PALPITATIONS: 0
MYALGIAS: 1
EYE PAIN: 0
SHORTNESS OF BREATH: 0
HEARTBURN: 0
SORE THROAT: 0
CONSTIPATION: 0
PARESTHESIAS: 0
FREQUENCY: 0
DIARRHEA: 0
HEMATURIA: 0
JOINT SWELLING: 0
HEMATOCHEZIA: 0
HEADACHES: 0
CHILLS: 0
COUGH: 0
NERVOUS/ANXIOUS: 1

## 2022-08-10 ASSESSMENT — SOCIAL DETERMINANTS OF HEALTH (SDOH)
DO YOU BELONG TO ANY CLUBS OR ORGANIZATIONS SUCH AS CHURCH GROUPS UNIONS, FRATERNAL OR ATHLETIC GROUPS, OR SCHOOL GROUPS?: YES
HOW OFTEN DO YOU ATTEND CHURCH OR RELIGIOUS SERVICES?: 1 TO 4 TIMES PER YEAR
ARE YOU MARRIED, WIDOWED, DIVORCED, SEPARATED, NEVER MARRIED, OR LIVING WITH A PARTNER?: NEVER MARRIED
HOW OFTEN DO YOU GET TOGETHER WITH FRIENDS OR RELATIVES?: ONCE A WEEK
IN A TYPICAL WEEK, HOW MANY TIMES DO YOU TALK ON THE PHONE WITH FAMILY, FRIENDS, OR NEIGHBORS?: ONCE A WEEK

## 2022-08-10 ASSESSMENT — LIFESTYLE VARIABLES
HOW OFTEN DO YOU HAVE SIX OR MORE DRINKS ON ONE OCCASION: NEVER
HOW MANY STANDARD DRINKS CONTAINING ALCOHOL DO YOU HAVE ON A TYPICAL DAY: 1 OR 2
HOW OFTEN DO YOU HAVE A DRINK CONTAINING ALCOHOL: MONTHLY OR LESS
SKIP TO QUESTIONS 9-10: 1
AUDIT-C TOTAL SCORE: 1

## 2022-08-10 ASSESSMENT — PAIN SCALES - GENERAL: PAINLEVEL: NO PAIN (0)

## 2022-08-10 NOTE — PROGRESS NOTES
"SUBJECTIVE:   CC: Chino Jesus is an 21 year old male who presents for preventative health visit.       Patient has been advised of split billing requirements and indicates understanding: Yes  Healthy Habits:     Getting at least 3 servings of Calcium per day:  Yes    Bi-annual eye exam:  Yes    Dental care twice a year:  Yes    Sleep apnea or symptoms of sleep apnea:  Daytime drowsiness    Diet:  Regular (no restrictions)    Frequency of exercise:  1 day/week    Duration of exercise:  Greater than 60 minutes    Taking medications regularly:  Yes    Medication side effects:  None    PHQ-2 Total Score: 0    Additional concerns today:  Yes    Working at [employer].       Anxiety Follow-Up    How are you doing with your anxiety since your last visit? No change    Are you having other symptoms that might be associated with anxiety? No    Have you had a significant life event? Grief or Loss cousin passed away (was shot in apt by [employer] police) a few weeks ago. Per mom patient sometimes asks very \"young\" questions, but then also some \"wise\" thoughts as well.     Are you feeling depressed? No    Do you have any concerns with your use of alcohol or other drugs? No    Social History     Tobacco Use     Smoking status: Never Smoker     Smokeless tobacco: Never Used   Substance Use Topics     Alcohol use: No     Drug use: No     GENE-7 SCORE 8/2/2019 6/25/2020 2/5/2021   Total Score 2 6 5     PHQ 8/2/2019   PHQ-9 Total Score 2   Q9: Thoughts of better off dead/self-harm past 2 weeks Not at all         Today's PHQ-2 Score:   PHQ-2 ( 1999 Pf[employer]) 8/10/2022   Q1: Little interest or pleasure in doing things 0   Q2: Feeling down, depressed or hopeless 0   PHQ-2 Score 0   PHQ-2 Total Score (12-17 Years)- Positive if 3 or more points; Administer PHQ-A if positive -   Q1: Little interest or pleasure in doing things Not at all   Q2: Feeling down, depressed or hopeless Not at all   PHQ-2 Score 0 "       Abuse: Current or Past(Physical, Sexual or Emotional)- No  Do you feel safe in your environment? Yes    Have you ever done Advance Care Planning? (For example, a Health Directive, POLST, or a discussion with a medical provider or your loved ones about your wishes): No, advance care planning information given to patient to review.  Patient declined advance care planning discussion at this time.    Social History     Tobacco Use     Smoking status: Never Smoker     Smokeless tobacco: Never Used   Substance Use Topics     Alcohol use: No         Alcohol Use 8/10/2022   Prescreen: >3 drinks/day or >7 drinks/week? Not Applicable       Last PSA: No results found for: PSA    Reviewed orders with patient. Reviewed health maintenance and updated orders accordingly - Yes      Reviewed and updated as needed this visit by clinical staff   Tobacco  Allergies    Med Hx  Surg Hx  Fam Hx  Soc Hx          Reviewed and updated as needed this visit by Provider                       Review of Systems   Constitutional: Negative for chills and fever.   HENT: Negative for congestion, ear pain, hearing loss and sore throat.    Eyes: Positive for visual disturbance. Negative for pain.   Respiratory: Negative for cough and shortness of breath.    Cardiovascular: Negative for chest pain, palpitations and peripheral edema.   Gastrointestinal: Positive for abdominal pain. Negative for constipation, diarrhea, heartburn, hematochezia and nausea.   Genitourinary: Negative for dysuria, frequency, genital sores, hematuria, impotence, penile discharge and urgency.   Musculoskeletal: Positive for arthralgias and myalgias. Negative for joint swelling.   Skin: Negative for rash.   Neurological: Negative for dizziness, weakness, headaches and paresthesias.   Psychiatric/Behavioral: Negative for mood changes. The patient is nervous/anxious.          OBJECTIVE:   /72 (BP Location: Right arm, Patient Position: Sitting, Cuff Size: Adult  "Regular)   Pulse 60   Temp 98.1  F (36.7  C) (Tympanic)   Resp 16   Ht 1.6 m (5' 3\")   Wt 64.6 kg (142 lb 6.4 oz)   SpO2 100%   BMI 25.23 kg/m      Physical Exam  GENERAL: healthy, alert and no distress  EYES: Eyes grossly normal to inspection, PERRL and conjunctivae and sclerae normal  HENT: ear canals and TM's normal, nose and mouth without ulcers or lesions  NECK: no adenopathy, no asymmetry, masses, or scars and thyroid normal to palpation  RESP: lungs clear to auscultation - no rales, rhonchi or wheezes  CV: regular rate and rhythm, normal S1 S2, no S3 or S4, no murmur, click or rub, no peripheral edema and peripheral pulses strong  ABDOMEN: soft, nontender, no hepatosplenomegaly, no masses and bowel sounds normal  MS: no gross musculoskeletal defects noted, no edema  SKIN: no suspicious lesions or rashes  NEURO: Normal strength and tone, mentation intact and speech normal  PSYCH: mentation appears normal, affect normal/bright    Diagnostic Test Results:  Labs reviewed in Epic    ASSESSMENT/PLAN:   (Z00.01) Encounter for general adult medical examination with abnormal findings  (primary encounter diagnosis)  Comment: Patient also brought up that 1-2 times per year has has a \"jolt\" that lasts 1 second and then goes away.  Unclear triggers, etc. He will start keeping of journal of these.  Plan:     (F90.2) Attention deficit hyperactivity disorder (ADHD), combined type  Comment: stable, refill medications.  Would like to start seeing therapy.   Plan: Adult Mental Health  Referral            (F39) Mood disorder (H)  Comment: stable, recent tragic loss of cousin in shooting, therapy.   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  Recommended sleep time tea, meditation, less phone use before bed.     Patient has been advised of split billing requirements and indicates understanding: Yes    COUNSELING:   Reviewed preventive health counseling, as reflected in patient instructions    Estimated body mass index is 25.23 kg/m  as calculated from the following:    Height as of this encounter: 1.6 m (5' 3\").    Weight as of this encounter: 64.6 kg (142 lb 6.4 oz).         He reports that he has never smoked. He has never used smokeless tobacco.      Counseling Resources:  ATP IV Guidelines  Pooled Cohorts Equation Calculator  FRAX Risk Assessment  ICSI Preventive Guidelines  Dietary Guidelines for Americans, 2010  USDA's MyPlate  ASA Prophylaxis  Lung CA Screening    Rita Benavides MD  [facility] [place]  "

## 2022-08-11 ASSESSMENT — ANXIETY QUESTIONNAIRES
6. BECOMING EASILY ANNOYED OR IRRITABLE: NOT AT ALL
3. WORRYING TOO MUCH ABOUT DIFFERENT THINGS: NOT AT ALL
7. FEELING AFRAID AS IF SOMETHING AWFUL MIGHT HAPPEN: NOT AT ALL
5. BEING SO RESTLESS THAT IT IS HARD TO SIT STILL: NOT AT ALL
1. FEELING NERVOUS, ANXIOUS, OR ON EDGE: SEVERAL DAYS
2. NOT BEING ABLE TO STOP OR CONTROL WORRYING: NOT AT ALL
GAD7 TOTAL SCORE: 2
GAD7 TOTAL SCORE: 2

## 2022-08-11 ASSESSMENT — PATIENT HEALTH QUESTIONNAIRE - PHQ9: 5. POOR APPETITE OR OVEREATING: SEVERAL DAYS

## 2022-10-27 ENCOUNTER — MYC REFILL (OUTPATIENT)
Dept: PEDIATRICS | Facility: CLINIC | Age: 22
End: 2022-10-27

## 2022-10-27 ENCOUNTER — TELEPHONE (OUTPATIENT)
Dept: PEDIATRICS | Facility: CLINIC | Age: 22
End: 2022-10-27

## 2022-10-27 DIAGNOSIS — F90.2 ATTENTION DEFICIT HYPERACTIVITY DISORDER (ADHD), COMBINED TYPE: ICD-10-CM

## 2022-10-27 RX ORDER — METHYLPHENIDATE HYDROCHLORIDE 54 MG/1
54 TABLET ORAL DAILY
Qty: 30 TABLET | Refills: 0 | Status: SHIPPED | OUTPATIENT
Start: 2022-11-27 | End: 2023-02-22

## 2022-10-27 RX ORDER — METHYLPHENIDATE HYDROCHLORIDE 54 MG/1
54 TABLET ORAL DAILY
Qty: 30 TABLET | Refills: 0 | Status: SHIPPED | OUTPATIENT
Start: 2022-10-27 | End: 2023-02-22

## 2022-10-27 RX ORDER — METHYLPHENIDATE HYDROCHLORIDE 54 MG/1
54 TABLET ORAL DAILY
Qty: 30 TABLET | Refills: 0 | Status: SHIPPED | OUTPATIENT
Start: 2022-12-28 | End: 2023-02-22

## 2022-10-27 RX ORDER — METHYLPHENIDATE HYDROCHLORIDE 54 MG/1
54 TABLET ORAL EVERY MORNING
Qty: 30 TABLET | Refills: 0 | Status: CANCELLED | OUTPATIENT
Start: 2022-10-27

## 2022-10-27 NOTE — TELEPHONE ENCOUNTER
Routing refill request to provider for review/approval because:  Drug not on the [facility] refill protocol     Dany BYNUM RN 10/27/2022 at 2:41 PM

## 2022-10-27 NOTE — TELEPHONE ENCOUNTER
Reason for Call:  Other prescription    Detailed comments: Patient mom called requesting refill for concerta 54 mg xr and patient uses [facility] in [place].    Phone Number Patient can be reached at: Cell number on file:    Telephone Information:   Mobile [phone]       Best Time: Anytime    Can we leave a detailed message on this number? YES    Call taken on 10/27/2022 at 4:06 PM by Carmen Porras

## 2022-11-20 ENCOUNTER — HEALTH MAINTENANCE LETTER (OUTPATIENT)
Age: 22
End: 2022-11-20

## 2022-12-21 ENCOUNTER — TRANSFERRED RECORDS (OUTPATIENT)
Dept: HEALTH INFORMATION MANAGEMENT | Facility: CLINIC | Age: 22
End: 2022-12-21

## 2023-01-09 ENCOUNTER — LAB (OUTPATIENT)
Dept: LAB | Facility: CLINIC | Age: 23
End: 2023-01-09
Payer: COMMERCIAL

## 2023-01-09 DIAGNOSIS — K51.90 MILD CHRONIC ULCERATIVE COLITIS (H): Primary | ICD-10-CM

## 2023-01-09 LAB
BASOPHILS # BLD AUTO: 0 10E3/UL (ref 0–0.2)
BASOPHILS NFR BLD AUTO: 0 %
CRP SERPL HS-MCNC: 0.51 MG/L
EOSINOPHIL # BLD AUTO: 0.2 10E3/UL (ref 0–0.7)
EOSINOPHIL NFR BLD AUTO: 4 %
ERYTHROCYTE [DISTWIDTH] IN BLOOD BY AUTOMATED COUNT: 12.4 % (ref 10–15)
HCT VFR BLD AUTO: 41.6 % (ref 40–53)
HGB BLD-MCNC: 14 G/DL (ref 13.3–17.7)
LYMPHOCYTES # BLD AUTO: 2.3 10E3/UL (ref 0.8–5.3)
LYMPHOCYTES NFR BLD AUTO: 42 %
MCH RBC QN AUTO: 27.5 PG (ref 26.5–33)
MCHC RBC AUTO-ENTMCNC: 33.7 G/DL (ref 31.5–36.5)
MCV RBC AUTO: 82 FL (ref 78–100)
MONOCYTES # BLD AUTO: 0.4 10E3/UL (ref 0–1.3)
MONOCYTES NFR BLD AUTO: 7 %
NEUTROPHILS # BLD AUTO: 2.5 10E3/UL (ref 1.6–8.3)
NEUTROPHILS NFR BLD AUTO: 46 %
PLATELET # BLD AUTO: 243 10E3/UL (ref 150–450)
RBC # BLD AUTO: 5.1 10E6/UL (ref 4.4–5.9)
WBC # BLD AUTO: 5.4 10E3/UL (ref 4–11)

## 2023-01-09 PROCEDURE — 85025 COMPLETE CBC W/AUTO DIFF WBC: CPT

## 2023-01-09 PROCEDURE — 86141 C-REACTIVE PROTEIN HS: CPT

## 2023-01-09 PROCEDURE — 36415 COLL VENOUS BLD VENIPUNCTURE: CPT

## 2023-01-11 PROCEDURE — 83993 ASSAY FOR CALPROTECTIN FECAL: CPT

## 2023-01-13 LAB — CALPROTECTIN STL-MCNT: 54.2 MG/KG (ref 0–49.9)

## 2023-01-27 ENCOUNTER — MYC REFILL (OUTPATIENT)
Dept: PEDIATRICS | Facility: CLINIC | Age: 23
End: 2023-01-27
Payer: COMMERCIAL

## 2023-01-27 ENCOUNTER — TELEPHONE (OUTPATIENT)
Dept: PEDIATRICS | Facility: CLINIC | Age: 23
End: 2023-01-27
Payer: COMMERCIAL

## 2023-01-27 DIAGNOSIS — F90.2 ATTENTION DEFICIT HYPERACTIVITY DISORDER (ADHD), COMBINED TYPE: ICD-10-CM

## 2023-01-27 RX ORDER — METHYLPHENIDATE HYDROCHLORIDE 54 MG/1
54 TABLET ORAL DAILY
Qty: 30 TABLET | Refills: 0 | Status: SHIPPED | OUTPATIENT
Start: 2023-03-30 | End: 2023-08-29

## 2023-01-27 RX ORDER — METHYLPHENIDATE HYDROCHLORIDE 54 MG/1
54 TABLET ORAL DAILY
Qty: 30 TABLET | Refills: 0 | Status: SHIPPED | OUTPATIENT
Start: 2023-01-27 | End: 2023-02-22

## 2023-01-27 RX ORDER — METHYLPHENIDATE HYDROCHLORIDE 54 MG/1
54 TABLET ORAL DAILY
Qty: 30 TABLET | Refills: 0 | Status: CANCELLED | OUTPATIENT
Start: 2023-01-27

## 2023-01-27 RX ORDER — METHYLPHENIDATE HYDROCHLORIDE 54 MG/1
54 TABLET ORAL DAILY
Qty: 30 TABLET | Refills: 0 | Status: SHIPPED | OUTPATIENT
Start: 2023-02-27 | End: 2023-09-14

## 2023-01-27 NOTE — TELEPHONE ENCOUNTER
Routing refill request to provider for review/approval because:  Drug not on the [facility] refill protocol     Brianna MENDIOLA RN, BSN

## 2023-01-27 NOTE — TELEPHONE ENCOUNTER
Patient mother asking why she sees a denial on his Concerta request. CTC on file. Advised that I see 4 requests for this, 3 were approved and 1 was denied.     She understood.    EDWIN Martinez on 1/27/2023 at 2:36 PM

## 2023-02-22 ENCOUNTER — VIRTUAL VISIT (OUTPATIENT)
Dept: PEDIATRICS | Facility: CLINIC | Age: 23
End: 2023-02-22
Payer: COMMERCIAL

## 2023-02-22 DIAGNOSIS — F90.2 ATTENTION DEFICIT HYPERACTIVITY DISORDER (ADHD), COMBINED TYPE: Primary | ICD-10-CM

## 2023-02-22 DIAGNOSIS — F39 MOOD DISORDER (H): ICD-10-CM

## 2023-02-22 DIAGNOSIS — F41.9 ANXIETY: ICD-10-CM

## 2023-02-22 DIAGNOSIS — K51.80 OTHER ULCERATIVE COLITIS WITHOUT COMPLICATION (H): ICD-10-CM

## 2023-02-22 PROCEDURE — 99214 OFFICE O/P EST MOD 30 MIN: CPT | Mod: VID | Performed by: PEDIATRICS

## 2023-02-22 RX ORDER — SERTRALINE HYDROCHLORIDE 100 MG/1
200 TABLET, FILM COATED ORAL DAILY
Qty: 180 TABLET | Refills: 1 | Status: SHIPPED | OUTPATIENT
Start: 2023-02-22 | End: 2023-09-14

## 2023-02-22 NOTE — PATIENT INSTRUCTIONS
Dear Chino,    It was good to see you today.    We will keep everything the same for today.    I'm so impressed with your running and floor hockey!  Keep it up!    We will see you in 6 months.    Rita Benavides MD  Internal Medicine/Pediatrics  [facility]

## 2023-02-22 NOTE — PROGRESS NOTES
"Chino is a 22 year old who is being evaluated via a billable video visit.      How would you like to obtain your AVS? MyChart  If the video visit is dropped, the invitation should be resent by: Text to cell phone: [phone]  Will anyone else be joining your video visit? No, actually Mom        Assessment & Plan     Mood disorder (H)  Stable - may consider decreasing sertraine to 150mg at next visit  - sertraline (ZOLOFT) 100 MG tablet; Take 2 tablets (200 mg) by mouth daily    Anxiety  As above  - sertraline (ZOLOFT) 100 MG tablet; Take 2 tablets (200 mg) by mouth daily    Other ulcerative colitis without complication (H)  Stable, per GI    Attention deficit hyperactivity disorder (ADHD), combined type  Stable, doing well at job and sports     BMI:   Estimated body mass index is 25.23 kg/m  as calculated from the following:    Height as of 8/10/22: 1.6 m (5' 3\").    Weight as of 8/10/22: 64.6 kg (142 lb 6.4 oz).       There are no Patient Instructions on file for this visit.    No follow-ups on file.    Rita Benavides MD  [facility] CRISTÓBAL Magana is a 22 year old accompanied by his mother, presenting for the following health issues:  Recheck Medication      HPI     Follow up on concerta, taking daily and tolerating well    Anxiety - taking zoloft    Patient still has some anxiety - worries, depression. Sometimes better with a quiet day, relax, uses phone. Exercise makes him feel better, like the better. Patient is doing more running recently for sports for floor hockey. Running inside. Some practices are late. \"superstar on the team\"    Last time were interested in starting therapy - did a few session from Aug to December. A little helpful, not a lot.     Sleep - emphasize good hygeine. Now sleep is \"fine\", sometimes wakes up and can't go back to sleep. Mom hears him downstairs.   with same issue. Still taking the melatonin 10mg before bed. Sleep better with activity. "     Patient still ice fishing - hauls all his own equipment. Also doing more activities with a friend.     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         Review of Systems         Objective           Vitals:  No vitals were obtained today due to virtual visit.    Physical Exam   GENERAL: Healthy, alert and no distress  EYES: Eyes grossly normal to inspection.  No discharge or erythema, or obvious scleral/conjunctival abnormalities.  RESP: No audible wheeze, cough, or visible cyanosis.  No visible retractions or increased work of breathing.    SKIN: Visible skin clear. No significant rash, abnormal pigmentation or lesions.  NEURO: Cranial nerves grossly intact.  Mentation and speech appropriate for age.  PSYCH: Mentation appears normal, affect normal/bright, judgement and insight intact, normal speech and appearance well-groomed.                Video-Visit Details    Type of service:  Video Visit   Video Start Time: 10:55am  Video End Time:11:11 AM    Originating Location (pt. Location): Home  Distant Location (provider location):  On-site  Platform used for Video Visit: Vince

## 2023-05-01 ENCOUNTER — MYC REFILL (OUTPATIENT)
Dept: PEDIATRICS | Facility: CLINIC | Age: 23
End: 2023-05-01
Payer: COMMERCIAL

## 2023-05-01 DIAGNOSIS — F90.2 ATTENTION DEFICIT HYPERACTIVITY DISORDER (ADHD), COMBINED TYPE: ICD-10-CM

## 2023-05-01 RX ORDER — METHYLPHENIDATE HYDROCHLORIDE 54 MG/1
54 TABLET ORAL DAILY
Qty: 30 TABLET | Refills: 0 | Status: SHIPPED | OUTPATIENT
Start: 2023-06-01 | End: 2023-09-14

## 2023-05-01 RX ORDER — METHYLPHENIDATE HYDROCHLORIDE 54 MG/1
54 TABLET ORAL DAILY
Qty: 30 TABLET | Refills: 0 | Status: SHIPPED | OUTPATIENT
Start: 2023-05-01 | End: 2023-07-31

## 2023-05-01 RX ORDER — METHYLPHENIDATE HYDROCHLORIDE 54 MG/1
54 TABLET ORAL DAILY
Qty: 30 TABLET | Refills: 0 | Status: CANCELLED | OUTPATIENT
Start: 2023-05-01

## 2023-05-01 RX ORDER — METHYLPHENIDATE HYDROCHLORIDE 54 MG/1
54 TABLET ORAL DAILY
Qty: 30 TABLET | Refills: 0 | Status: SHIPPED | OUTPATIENT
Start: 2023-07-02 | End: 2023-08-29

## 2023-06-22 ENCOUNTER — OFFICE VISIT (OUTPATIENT)
Dept: PEDIATRICS | Facility: CLINIC | Age: 23
End: 2023-06-22
Payer: COMMERCIAL

## 2023-06-22 VITALS
OXYGEN SATURATION: 100 % | WEIGHT: 145.9 LBS | SYSTOLIC BLOOD PRESSURE: 121 MMHG | HEART RATE: 66 BPM | HEIGHT: 63 IN | BODY MASS INDEX: 25.85 KG/M2 | DIASTOLIC BLOOD PRESSURE: 78 MMHG | RESPIRATION RATE: 16 BRPM | TEMPERATURE: 98 F

## 2023-06-22 DIAGNOSIS — F95.8 MOTOR TIC DISORDER: Primary | ICD-10-CM

## 2023-06-22 PROCEDURE — 99214 OFFICE O/P EST MOD 30 MIN: CPT | Performed by: PEDIATRICS

## 2023-06-22 ASSESSMENT — PAIN SCALES - GENERAL: PAINLEVEL: NO PAIN (0)

## 2023-06-22 NOTE — PROGRESS NOTES
"  Assessment & Plan     (F95.8) Motor tic disorder  (primary encounter diagnosis)  Comment: suspect functional motor disorder given that this decreased with distractibility.  Given constellation of symptoms, recommend MRI and neuro follow up.  Patient did well at appt - no noticeable tic. See PI for plan to start retraining neck muscles.   Plan: MR Brain w/o & w Contrast, Adult Neurology          Referral       BMI:   Estimated body mass index is 25.53 kg/m  as calculated from the following:    Height as of this encounter: 1.61 m (5' 3.39\").    Weight as of this encounter: 66.2 kg (145 lb 14.4 oz).       Patient Instructions   You will be called for MRI and neurology referral.    In the meantime:    You pick the time of the day to this.  Do this routine 10 time:    1. Bend right x 3 seconds  2. Bend left x 3 second  3. Look up x 3 seconds  4. Look down x 3 seconds  5. Turn right x 3 seconds  6. Turn left x 3 seconds    Then, large shoulder roll followed by a deep breath.    Rita Benavides MD  Internal Medicine/Pediatrics  [facility]        Rita Benavides MD  [facility]    Alfonso Magana is a 22 year old, presenting for the following health issues:  Neck Problem (Noticed to be turning neck often on his left side, chronic issue as it is a tick. Usually does it when he is bored without noticing. Witin the past 3 months it was increased more and now causes stiffness in neck )        6/22/2023     9:05 AM   Additional Questions   Roomed by ORTEGA Alonzo and ROSALIO Calix   Accompanied by Self and Mother         6/22/2023     9:05 AM   Patient Reported Additional Medications   Patient reports taking the following new medications n/a     History of Present Illness       Reason for visit:  Tic, twisting head to the left dozens of times per day  Symptom onset:  More than a month  Symptoms include:  Turns head to left repeatedly  Symptom intensity:  Moderate  Symptom progression:  " Staying the same  Had these symptoms before:  No  What makes it worse:  No  What makes it better:  No    He eats 2-3 servings of fruits and vegetables daily.He consumes 1 sweetened beverage(s) daily.He exercises with enough effort to increase his heart rate 20 to 29 minutes per day.  He exercises with enough effort to increase his heart rate 4 days per week.   He is taking medications regularly.     Last visit 2/22/23 for ADHD    [facility]    I am Chino Jesus s mom writing to see if you can find an appointment time to see Chino. He has a new medical condition that is concerning and when I look at scheduling for you you re booked into September. I d prefer he sees you because you know him and his current conditions. He has developed a new tic where he continues to jerk his head to the left. It s better when he s busy but he s doing it maybe 100 times per day. It s been going on for over a month and I was hoping it would subside but it doesn t, even when he says his neck gets sore. 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 My neighbor is Dr. Adamaris Stewart and she has mentioned possible medications?    Today:    Patient states he had a few episodes a few years ago.    He states that in March the left side of his neck seemed tight and now this has developed into a tic. Tic is a full turn to the left side (sometimes 3-4 in fast succession).  Patient feels like he doesn't have control over this.  He feels this is about 25% better now.  Mom states is it throughout the day.  Mom does not think can control it. If he is busy/distraced then it is less. If he is bored it happens.     Has had a toe tapping tic on and off for a few years. Also flicks knee back when tired. Also locks elbows and moves throat around.     Triggers: tired    No phonic tics.    Other: night terrors, sometimes strange behavior  "close to AM wake up time. Confrontational dreams.  Mom just has to whisper \"you are dreaming\"  He will then answer and usually calms down, but then sometimes restarts.  He doesn't remember this in the morning.  Overall mom thinks this is getting better.               Review of Systems         Objective    /78   Pulse 66   Temp 98  F (36.7  C) (Tympanic)   Resp 16   Ht 1.61 m (5' 3.39\")   Wt 66.2 kg (145 lb 14.4 oz)   SpO2 100%   BMI 25.53 kg/m    Body mass index is 25.53 kg/m .  Physical Exam   GENERAL APPEARANCE: healthy, alert and no distress  NEURO: Normal strength and tone, mentation intact, speech normal, DTR symmetrically normal in lower extremities, cranial nerves 2-12 intact, Romberg negative and rapid alternating movements normal                    "

## 2023-06-22 NOTE — PATIENT INSTRUCTIONS
You will be called for MRI and neurology referral.    In the meantime:    You pick the time of the day to this.  Do this routine 10 time:    Bend right x 3 seconds  Bend left x 3 second  Look up x 3 seconds  Look down x 3 seconds  Turn right x 3 seconds  Turn left x 3 seconds    Then, large shoulder roll followed by a deep breath.    Rita Benavides MD  Internal Medicine/Pediatrics  [facility]

## 2023-07-06 ENCOUNTER — HOSPITAL ENCOUNTER (OUTPATIENT)
Dept: MRI IMAGING | Facility: HOSPITAL | Age: 23
Discharge: HOME | End: 2023-07-06
Attending: PEDIATRICS | Admitting: PEDIATRICS
Payer: COMMERCIAL

## 2023-07-06 DIAGNOSIS — F95.8 MOTOR TIC DISORDER: ICD-10-CM

## 2023-07-06 PROCEDURE — 70553 MRI BRAIN STEM W/O & W/DYE: CPT

## 2023-07-06 PROCEDURE — A9585 GADOBUTROL INJECTION: HCPCS | Mod: JZ | Performed by: PEDIATRICS

## 2023-07-06 PROCEDURE — 255N000002 HC RX 255 OP 636: Mod: JZ | Performed by: PEDIATRICS

## 2023-07-06 RX ORDER — GADOBUTROL 604.72 MG/ML
6.5 INJECTION INTRAVENOUS ONCE
Status: COMPLETED | OUTPATIENT
Start: 2023-07-06 | End: 2023-07-06

## 2023-07-06 RX ADMIN — GADOBUTROL 6.5 ML: 604.72 INJECTION INTRAVENOUS at 18:06

## 2023-07-11 ENCOUNTER — PATIENT OUTREACH (OUTPATIENT)
Dept: CARE COORDINATION | Facility: CLINIC | Age: 23
End: 2023-07-11
Payer: COMMERCIAL

## 2023-07-25 ENCOUNTER — PATIENT OUTREACH (OUTPATIENT)
Dept: CARE COORDINATION | Facility: CLINIC | Age: 23
End: 2023-07-25
Payer: COMMERCIAL

## 2023-07-31 ENCOUNTER — MYC REFILL (OUTPATIENT)
Dept: PEDIATRICS | Facility: CLINIC | Age: 23
End: 2023-07-31
Payer: COMMERCIAL

## 2023-07-31 DIAGNOSIS — F90.2 ATTENTION DEFICIT HYPERACTIVITY DISORDER (ADHD), COMBINED TYPE: ICD-10-CM

## 2023-08-01 RX ORDER — METHYLPHENIDATE HYDROCHLORIDE 54 MG/1
54 TABLET ORAL DAILY
Qty: 30 TABLET | Refills: 0 | Status: SHIPPED | OUTPATIENT
Start: 2023-08-02 | End: 2023-08-29

## 2023-08-29 DIAGNOSIS — F90.2 ATTENTION DEFICIT HYPERACTIVITY DISORDER (ADHD), COMBINED TYPE: ICD-10-CM

## 2023-08-29 NOTE — TELEPHONE ENCOUNTER
"Dr. Benavides,    Pt's mother calling to request Concerta refill for her son. She reports they are going out of town Thursday 8/31 and need to  the prescription prior.     She also requests that the refill is sent for 3 months as this has \"historically\" been the case.    Pended requested med and pharmacy. Please review and advise, thank you!  Sha Ha RN on 8/29/2023 at 12:53 PM    "

## 2023-08-30 ENCOUNTER — MYC REFILL (OUTPATIENT)
Dept: PEDIATRICS | Facility: CLINIC | Age: 23
End: 2023-08-30
Payer: COMMERCIAL

## 2023-08-30 DIAGNOSIS — F90.2 ATTENTION DEFICIT HYPERACTIVITY DISORDER (ADHD), COMBINED TYPE: ICD-10-CM

## 2023-08-30 RX ORDER — METHYLPHENIDATE HYDROCHLORIDE 54 MG/1
54 TABLET ORAL DAILY
Qty: 30 TABLET | Refills: 0 | Status: SHIPPED | OUTPATIENT
Start: 2023-08-30 | End: 2023-09-14

## 2023-08-30 RX ORDER — METHYLPHENIDATE HYDROCHLORIDE 54 MG/1
54 TABLET ORAL DAILY
Qty: 30 TABLET | Refills: 0 | Status: SHIPPED | OUTPATIENT
Start: 2023-10-29 | End: 2024-01-08

## 2023-08-30 RX ORDER — METHYLPHENIDATE HYDROCHLORIDE 54 MG/1
54 TABLET ORAL DAILY
Qty: 30 TABLET | Refills: 0 | Status: SHIPPED | OUTPATIENT
Start: 2023-09-29 | End: 2024-01-08

## 2023-09-11 RX ORDER — METHYLPHENIDATE HYDROCHLORIDE 54 MG/1
54 TABLET ORAL DAILY
Qty: 30 TABLET | Refills: 0 | OUTPATIENT
Start: 2023-09-11

## 2023-09-13 ASSESSMENT — ANXIETY QUESTIONNAIRES
3. WORRYING TOO MUCH ABOUT DIFFERENT THINGS: NOT AT ALL
2. NOT BEING ABLE TO STOP OR CONTROL WORRYING: NOT AT ALL
GAD7 TOTAL SCORE: 0
6. BECOMING EASILY ANNOYED OR IRRITABLE: NOT AT ALL
5. BEING SO RESTLESS THAT IT IS HARD TO SIT STILL: NOT AT ALL
4. TROUBLE RELAXING: NOT AT ALL
1. FEELING NERVOUS, ANXIOUS, OR ON EDGE: NOT AT ALL
7. FEELING AFRAID AS IF SOMETHING AWFUL MIGHT HAPPEN: NOT AT ALL
GAD7 TOTAL SCORE: 0

## 2023-09-14 ENCOUNTER — OFFICE VISIT (OUTPATIENT)
Dept: PEDIATRICS | Facility: CLINIC | Age: 23
End: 2023-09-14
Payer: COMMERCIAL

## 2023-09-14 VITALS
TEMPERATURE: 98 F | BODY MASS INDEX: 26.65 KG/M2 | WEIGHT: 150.4 LBS | DIASTOLIC BLOOD PRESSURE: 65 MMHG | HEART RATE: 64 BPM | RESPIRATION RATE: 16 BRPM | SYSTOLIC BLOOD PRESSURE: 102 MMHG | OXYGEN SATURATION: 97 % | HEIGHT: 63 IN

## 2023-09-14 DIAGNOSIS — F39 MOOD DISORDER (H): ICD-10-CM

## 2023-09-14 DIAGNOSIS — F90.8 ATTENTION DEFICIT HYPERACTIVITY DISORDER (ADHD), OTHER TYPE: Primary | ICD-10-CM

## 2023-09-14 DIAGNOSIS — F41.9 ANXIETY: ICD-10-CM

## 2023-09-14 DIAGNOSIS — J31.0 CHRONIC RHINITIS: ICD-10-CM

## 2023-09-14 PROCEDURE — 99214 OFFICE O/P EST MOD 30 MIN: CPT | Mod: 25 | Performed by: PEDIATRICS

## 2023-09-14 PROCEDURE — 90471 IMMUNIZATION ADMIN: CPT | Performed by: PEDIATRICS

## 2023-09-14 PROCEDURE — 90686 IIV4 VACC NO PRSV 0.5 ML IM: CPT | Performed by: PEDIATRICS

## 2023-09-14 RX ORDER — SERTRALINE HYDROCHLORIDE 100 MG/1
200 TABLET, FILM COATED ORAL DAILY
Qty: 180 TABLET | Refills: 1 | Status: SHIPPED | OUTPATIENT
Start: 2023-09-14 | End: 2024-01-08

## 2023-09-14 RX ORDER — FLUTICASONE PROPIONATE 50 MCG
2 SPRAY, SUSPENSION (ML) NASAL DAILY
Qty: 16 G | Refills: 3 | Status: SHIPPED | OUTPATIENT
Start: 2023-09-14

## 2023-09-14 RX ORDER — FLUTICASONE PROPIONATE 50 MCG
2 SPRAY, SUSPENSION (ML) NASAL DAILY
Qty: 48 G | OUTPATIENT
Start: 2023-09-14

## 2023-09-14 ASSESSMENT — PAIN SCALES - GENERAL: PAINLEVEL: NO PAIN (0)

## 2023-09-14 NOTE — PATIENT INSTRUCTIONS
Goals:    No eating in the middle of the night.   Your lunch drink needs to be water (no pop or juice)    Follow-up in 6 months.

## 2023-09-14 NOTE — PROGRESS NOTES
"  Assessment & Plan     (F39) Mood disorder (H)  Comment: stable, but gets anxious when he is bored.  Discussed exercising when bored.  Plan: sertraline (ZOLOFT) 100 MG tablet            (F41.9) Anxiety  Comment: as above  Plan: sertraline (ZOLOFT) 100 MG tablet        We discussed drinking at great length as meds lowers this threshold for the effects of etoh.     (J31.0) Chronic rhinitis  Comment:   Plan: fluticasone (FLONASE) 50 MCG/ACT nasal spray                     ADHD - stable - ok for prescription every 3 months.    Tics have also improved and he is working with a therapist    BMI:   Estimated body mass index is 26.47 kg/m  as calculated from the following:    Height as of this encounter: 1.605 m (5' 3.2\").    Weight as of this encounter: 68.2 kg (150 lb 6.4 oz).       Patient Instructions   Goals:    No eating in the middle of the night.   Your lunch drink needs to be water (no pop or juice)    Follow-up in 6 months.     Rita Benavides MD  [facility] CRISTÓBAL Magana is a 22 year old, presenting for the following health issues:  Recheck Medication      9/14/2023     1:40 PM   Additional Questions   Roomed by isreal pacheco   Accompanied by katie         9/14/2023     1:40 PM   Patient Reported Additional Medications   Patient reports taking the following new medications n/a       History of Present Illness       Mental Health Follow-up:  Patient presents to follow-up on Anxiety.    Patient's anxiety since last visit has been:  No change  The patient is not having other symptoms associated with anxiety.  Any significant life events: No  Patient is not feeling anxious or having panic attacks.  Patient has no concerns about alcohol or drug use.    Reason for visit:  Routine for adhd medication    He eats 2-3 servings of fruits and vegetables daily.He consumes 0 sweetened beverage(s) daily.He exercises with enough effort to increase his heart rate 60 or more minutes per day.  He " "exercises with enough effort to increase his heart rate 4 days per week.   He is taking medications regularly.     Tic update - MRI was normal.  Wedding went well! Could not find a neurologist that specialized in tics. Mom was able to find psychologist the specialized in tics - visits are going well. \"Competing response\"    Seeing improvements \"medium\"    Occ beer - drank too much at wedding    Anxiety - some days 30-40%. Patient like to be busy.     Review of Systems         Objective    /65 (BP Location: Right arm, Patient Position: Sitting, Cuff Size: Adult Large)   Pulse 64   Temp 98  F (36.7  C) (Temporal)   Resp 16   Ht 1.605 m (5' 3.2\")   Wt 68.2 kg (150 lb 6.4 oz)   SpO2 97%   BMI 26.47 kg/m    Body mass index is 26.47 kg/m .  Physical Exam                   "

## 2023-09-16 ENCOUNTER — HEALTH MAINTENANCE LETTER (OUTPATIENT)
Age: 23
End: 2023-09-16

## 2023-11-20 ENCOUNTER — MYC MEDICAL ADVICE (OUTPATIENT)
Dept: PEDIATRICS | Facility: CLINIC | Age: 23
End: 2023-11-20
Payer: COMMERCIAL

## 2023-12-01 ENCOUNTER — MYC REFILL (OUTPATIENT)
Dept: PEDIATRICS | Facility: CLINIC | Age: 23
End: 2023-12-01
Payer: COMMERCIAL

## 2023-12-01 DIAGNOSIS — F90.2 ATTENTION DEFICIT HYPERACTIVITY DISORDER (ADHD), COMBINED TYPE: ICD-10-CM

## 2023-12-01 RX ORDER — METHYLPHENIDATE HYDROCHLORIDE 54 MG/1
54 TABLET ORAL DAILY
Qty: 30 TABLET | Refills: 0 | Status: SHIPPED | OUTPATIENT
Start: 2024-01-01 | End: 2024-07-16

## 2023-12-01 RX ORDER — METHYLPHENIDATE HYDROCHLORIDE 54 MG/1
54 TABLET ORAL DAILY
Qty: 30 TABLET | Refills: 0 | Status: SHIPPED | OUTPATIENT
Start: 2023-12-01 | End: 2024-03-05

## 2023-12-01 RX ORDER — METHYLPHENIDATE HYDROCHLORIDE 54 MG/1
54 TABLET ORAL DAILY
Qty: 30 TABLET | Refills: 0 | Status: CANCELLED | OUTPATIENT
Start: 2023-12-01

## 2023-12-01 RX ORDER — METHYLPHENIDATE HYDROCHLORIDE 54 MG/1
54 TABLET ORAL DAILY
Qty: 30 TABLET | Refills: 0 | Status: SHIPPED | OUTPATIENT
Start: 2024-02-01 | End: 2024-07-16

## 2024-01-08 ENCOUNTER — VIRTUAL VISIT (OUTPATIENT)
Dept: PEDIATRICS | Facility: CLINIC | Age: 24
End: 2024-01-08
Payer: COMMERCIAL

## 2024-01-08 DIAGNOSIS — K51.80 OTHER ULCERATIVE COLITIS WITHOUT COMPLICATION (H): ICD-10-CM

## 2024-01-08 DIAGNOSIS — F41.9 ANXIETY: ICD-10-CM

## 2024-01-08 DIAGNOSIS — F90.2 ATTENTION DEFICIT HYPERACTIVITY DISORDER (ADHD), COMBINED TYPE: Primary | ICD-10-CM

## 2024-01-08 DIAGNOSIS — F39 MOOD DISORDER (H): ICD-10-CM

## 2024-01-08 PROCEDURE — 99214 OFFICE O/P EST MOD 30 MIN: CPT | Mod: 95 | Performed by: PEDIATRICS

## 2024-01-08 RX ORDER — SERTRALINE HYDROCHLORIDE 100 MG/1
200 TABLET, FILM COATED ORAL DAILY
Qty: 180 TABLET | Refills: 1 | Status: SHIPPED | OUTPATIENT
Start: 2024-01-08 | End: 2024-07-16

## 2024-01-08 NOTE — PROGRESS NOTES
"Chino is a 23 year old who is being evaluated via a billable video visit.      How would you like to obtain your AVS? MyChart  If the video visit is dropped, the invitation should be resent by: Text to cell phone: [phone]  Will anyone else be joining your video visit? Yes, mom          Assessment & Plan     (F90.2) Attention deficit hyperactivity disorder (ADHD), combined type  (primary encounter diagnosis)  Comment: stable  Plan: ok for 3 months of prescription at a time    (F39) Mood disorder (H24)  Comment: stable  Plan: sertraline (ZOLOFT) 100 MG tablet        continue    (K51.80) Other ulcerative colitis without complication (H)  Comment: stable  Plan: yearly GI visits    (F41.9) Anxiety  Comment: stable  Plan: sertraline (ZOLOFT) 100 MG tablet             BMI:   Estimated body mass index is 26.47 kg/m  as calculated from the following:    Height as of 9/14/23: 1.605 m (5' 3.2\").    Weight as of 9/14/23: 68.2 kg (150 lb 6.4 oz).           Rita Benavides MD  [facility] CRISTÓBAL Magana is a 23 year old, presenting for the following health issues:  No chief complaint on file.      History of Present Illness       Reason for visit:  6 month check in for medication    He eats 2-3 servings of fruits and vegetables daily.He consumes 1 sweetened beverage(s) daily.He exercises with enough effort to increase his heart rate 20 to 29 minutes per day.  He exercises with enough effort to increase his heart rate 4 days per week.   He is taking medications regularly.     Last visit 9/14/23 - Mood - sertraline stable. ADHD stable.     Family went to [place] and not the family has been sick for about a month. Patient had COVID mid-December.     Goals from last time  --no eating at night - the middle for the night. Patient thinks a little bit less.     Weight loss 141 - before he was sick previously 150 pounds.   Floor hockey and running treadmill.     Making little changes    Ulceratrive " colitis - Follows with Dr. Moran - rose per patient and family. Yearly visits. Still using bentyl and cholestryramine prn.     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     --            Review of Systems         Objective           Vitals:  No vitals were obtained today due to virtual visit.    Physical Exam   GENERAL: Healthy, alert and no distress  EYES: Eyes grossly normal to inspection.  No discharge or erythema, or obvious scleral/conjunctival abnormalities.  RESP: No audible wheeze, cough, or visible cyanosis.  No visible retractions or increased work of breathing.    SKIN: Visible skin clear. No significant rash, abnormal pigmentation or lesions.  NEURO: Cranial nerves grossly intact.  Mentation and speech appropriate for age.  PSYCH: Mentation appears normal, affect normal/bright, judgement and insight intact, normal speech and appearance well-groomed.                Video-Visit Details    Type of service:  Video Visit     Originating Location (pt. Location): Home    Distant Location (provider location):  Off-site  Platform used for Video Visit: Vince

## 2024-01-16 ENCOUNTER — MYC MEDICAL ADVICE (OUTPATIENT)
Dept: PEDIATRICS | Facility: CLINIC | Age: 24
End: 2024-01-16
Payer: COMMERCIAL

## 2024-01-25 NOTE — TELEPHONE ENCOUNTER
General Call      Reason for Call:  returning call from clinic    What are your questions or concerns:  Schedule a physical with primary in 6 months.    Date of last appointment with provider: n/a    Could we send this information to you [facility] or would you prefer to receive a phone call?:   Patient would prefer a phone call   Okay to leave a detailed message?: Yes at Cell number on file:    Telephone Information:   Mobile [phone]

## 2024-01-26 NOTE — TELEPHONE ENCOUNTER
Spoke w/ mom - CTC on file.    Scheduled pt for px/med check on 6/14 w/ Dr. Benavides.    Monserrat Smith on 1/26/2024 at 10:24 AM

## 2024-03-05 ENCOUNTER — MYC REFILL (OUTPATIENT)
Dept: PEDIATRICS | Facility: CLINIC | Age: 24
End: 2024-03-05
Payer: COMMERCIAL

## 2024-03-05 DIAGNOSIS — F90.2 ATTENTION DEFICIT HYPERACTIVITY DISORDER (ADHD), COMBINED TYPE: ICD-10-CM

## 2024-03-06 RX ORDER — METHYLPHENIDATE HYDROCHLORIDE 54 MG/1
54 TABLET ORAL DAILY
Qty: 30 TABLET | Refills: 0 | Status: SHIPPED | OUTPATIENT
Start: 2024-03-06 | End: 2024-07-16

## 2024-03-06 RX ORDER — METHYLPHENIDATE HYDROCHLORIDE 54 MG/1
54 TABLET ORAL EVERY MORNING
Qty: 30 TABLET | Refills: 0 | Status: SHIPPED | OUTPATIENT
Start: 2024-04-03 | End: 2024-07-16

## 2024-03-06 RX ORDER — METHYLPHENIDATE HYDROCHLORIDE 54 MG/1
54 TABLET ORAL EVERY MORNING
Qty: 30 TABLET | Refills: 0 | Status: SHIPPED | OUTPATIENT
Start: 2024-05-01 | End: 2024-07-16

## 2024-03-06 NOTE — TELEPHONE ENCOUNTER
Spoke w/ Ani - mom/guardian and let her know rx has been sent for 3 months.    Monserrat Smith on 3/6/2024 at 4:00 PM

## 2024-04-02 ENCOUNTER — TRANSFERRED RECORDS (OUTPATIENT)
Dept: HEALTH INFORMATION MANAGEMENT | Facility: CLINIC | Age: 24
End: 2024-04-02

## 2024-06-04 ENCOUNTER — MYC REFILL (OUTPATIENT)
Dept: PEDIATRICS | Facility: CLINIC | Age: 24
End: 2024-06-04
Payer: COMMERCIAL

## 2024-06-04 DIAGNOSIS — F90.2 ATTENTION DEFICIT HYPERACTIVITY DISORDER (ADHD), COMBINED TYPE: ICD-10-CM

## 2024-06-04 RX ORDER — METHYLPHENIDATE HYDROCHLORIDE 54 MG/1
54 TABLET ORAL DAILY
Qty: 30 TABLET | Refills: 0 | Status: SHIPPED | OUTPATIENT
Start: 2024-07-04 | End: 2024-07-16

## 2024-06-04 RX ORDER — METHYLPHENIDATE HYDROCHLORIDE 54 MG/1
54 TABLET ORAL DAILY
Qty: 30 TABLET | Refills: 0 | Status: SHIPPED | OUTPATIENT
Start: 2024-06-04 | End: 2024-07-16

## 2024-06-04 RX ORDER — METHYLPHENIDATE HYDROCHLORIDE 54 MG/1
54 TABLET ORAL DAILY
Qty: 30 TABLET | Refills: 0 | Status: SHIPPED | OUTPATIENT
Start: 2024-08-03 | End: 2024-09-02

## 2024-06-04 RX ORDER — METHYLPHENIDATE HYDROCHLORIDE 54 MG/1
54 TABLET ORAL DAILY
Qty: 30 TABLET | Refills: 0 | Status: CANCELLED | OUTPATIENT
Start: 2024-06-04

## 2024-07-16 ENCOUNTER — OFFICE VISIT (OUTPATIENT)
Dept: PEDIATRICS | Facility: CLINIC | Age: 24
End: 2024-07-16
Payer: COMMERCIAL

## 2024-07-16 VITALS
SYSTOLIC BLOOD PRESSURE: 130 MMHG | TEMPERATURE: 97.8 F | WEIGHT: 147.1 LBS | DIASTOLIC BLOOD PRESSURE: 76 MMHG | OXYGEN SATURATION: 96 % | RESPIRATION RATE: 18 BRPM | HEIGHT: 63 IN | HEART RATE: 69 BPM | BODY MASS INDEX: 26.06 KG/M2

## 2024-07-16 DIAGNOSIS — R21 RASH: ICD-10-CM

## 2024-07-16 DIAGNOSIS — F39 MOOD DISORDER (H): ICD-10-CM

## 2024-07-16 DIAGNOSIS — Z00.00 ROUTINE GENERAL MEDICAL EXAMINATION AT A HEALTH CARE FACILITY: Primary | ICD-10-CM

## 2024-07-16 DIAGNOSIS — F41.9 ANXIETY: ICD-10-CM

## 2024-07-16 DIAGNOSIS — N32.89 BLADDER SPASM: ICD-10-CM

## 2024-07-16 DIAGNOSIS — F90.2 ATTENTION DEFICIT HYPERACTIVITY DISORDER (ADHD), COMBINED TYPE: ICD-10-CM

## 2024-07-16 PROCEDURE — 99395 PREV VISIT EST AGE 18-39: CPT | Performed by: PEDIATRICS

## 2024-07-16 PROCEDURE — 99214 OFFICE O/P EST MOD 30 MIN: CPT | Mod: 25 | Performed by: PEDIATRICS

## 2024-07-16 RX ORDER — METHYLPHENIDATE HYDROCHLORIDE 54 MG/1
54 TABLET ORAL DAILY
Qty: 30 TABLET | Refills: 0 | Status: SHIPPED | OUTPATIENT
Start: 2024-09-01 | End: 2024-10-01

## 2024-07-16 RX ORDER — METHYLPHENIDATE HYDROCHLORIDE 54 MG/1
54 TABLET ORAL DAILY
Qty: 30 TABLET | Refills: 0 | Status: SHIPPED | OUTPATIENT
Start: 2024-11-01 | End: 2024-12-01

## 2024-07-16 RX ORDER — SERTRALINE HYDROCHLORIDE 100 MG/1
200 TABLET, FILM COATED ORAL DAILY
Qty: 180 TABLET | Refills: 1 | Status: SHIPPED | OUTPATIENT
Start: 2024-07-16

## 2024-07-16 RX ORDER — METHYLPHENIDATE HYDROCHLORIDE 54 MG/1
54 TABLET ORAL DAILY
Qty: 30 TABLET | Refills: 0 | Status: SHIPPED | OUTPATIENT
Start: 2024-09-30 | End: 2024-10-30

## 2024-07-16 SDOH — HEALTH STABILITY: PHYSICAL HEALTH: ON AVERAGE, HOW MANY DAYS PER WEEK DO YOU ENGAGE IN MODERATE TO STRENUOUS EXERCISE (LIKE A BRISK WALK)?: 3 DAYS

## 2024-07-16 SDOH — HEALTH STABILITY: PHYSICAL HEALTH: ON AVERAGE, HOW MANY MINUTES DO YOU ENGAGE IN EXERCISE AT THIS LEVEL?: PATIENT DECLINED

## 2024-07-16 ASSESSMENT — PAIN SCALES - GENERAL: PAINLEVEL: NO PAIN (0)

## 2024-07-16 ASSESSMENT — SOCIAL DETERMINANTS OF HEALTH (SDOH): HOW OFTEN DO YOU GET TOGETHER WITH FRIENDS OR RELATIVES?: ONCE A WEEK

## 2024-07-16 NOTE — PROGRESS NOTES
"Preventive Care Visit  [facility], Internal Medicine - Pediatrics  Jul 16, 2024      Assessment & Plan     (Z00.00) Routine general medical examination at a health care facility  (primary encounter diagnosis)  Comment:   Plan:     (F39) Mood disorder (H24)  Comment: stable  Plan: sertraline (ZOLOFT) 100 MG tablet        Continue - try taking in AM so can be taken same time every day    (F41.9) Anxiety  Comment: as above  Plan: sertraline (ZOLOFT) 100 MG tablet            (F90.2) Attention deficit hyperactivity disorder (ADHD), combined type  Comment: stable  Plan: methylphenidate HCl ER, OSM, (CONCERTA) 54 MG         CR tablet, methylphenidate HCl ER, OSM,         (CONCERTA) 54 MG CR tablet, methylphenidate HCl        ER, OSM, (CONCERTA) 54 MG CR tablet        Emphasized importance of taking this same time every day.    (R21) Rash  Comment: not present right now. Seems to have hypersensitivity to mosquito bites, pools  Plan: advised to keep journal, rufus prn    (N32.89) Bladder spasm  Comment: resolves on its own  Plan: keep track of this    Patient has been advised of split billing requirements and indicates understanding: Yes        BMI  Estimated body mass index is 26.19 kg/m  as calculated from the following:    Height as of this encounter: 1.596 m (5' 2.84\").    Weight as of this encounter: 66.7 kg (147 lb 1.6 oz).       Counseling  Appropriate preventive services were discussed with this patient, including applicable screening as appropriate for fall prevention, nutrition, physical activity, Tobacco-use cessation, weight loss and cognition.  Checklist reviewing preventive services available has been given to the patient.  Reviewed patient's diet, addressing concerns and/or questions.   He is at risk for lack of exercise and has been provided with information to increase physical activity for the benefit of his well-being.   He is at risk for psychosocial distress " and has been provided with information to reduce risk.       See Patient Instructions    Alfonso Magana is a 23 year old, presenting for the following:  Physical (Non fasting ), Recheck Medication, and Derm Problem (Rash on and off, mosquitoes bites, from swimming and sometimes random and spreads all over )        7/16/2024    10:07 AM   Additional Questions   Roomed by LEA Alonzo   Accompanied by Father Ronald         7/16/2024    10:07 AM   Patient Reported Additional Medications   Patient reports taking the following new medications n/A        Health Care Directive  Patient does not have a Health Care Directive or Living Will:     HPI    ADHD - stable on methylphenidate ER 54mg daily. Per  last  7/3/24.        Anxiety - on sertraline 200mg daily    Ulcerative colitis - follows with Dr. Moran, yearly visits. On bentyl and cholestyramine prn. Stopped the Bentyl, now taking ?hyocyamine?    SH: working at [employer]    Working on night eating overnight.     Wt Readings from Last 3 Encounters:   07/16/24 66.7 kg (147 lb 1.6 oz)   09/14/23 68.2 kg (150 lb 6.4 oz)   06/22/23 66.2 kg (145 lb 14.4 oz)     Rash - with mosquito bites, swimming    Abd pain - sometimes pain near bladder, triggered by stretching when standing up and then painful with urination. Will take tylenol and then its better after an hour. Happened over years. Happened about a week ago.             7/16/2024   General Health   How would you rate your overall physical health? Good   Feel stress (tense, anxious, or unable to sleep) Only a little      (!) STRESS CONCERN      7/16/2024   Nutrition   Three or more servings of calcium each day? Yes   Diet: Regular (no restrictions)   How many servings of fruit and vegetables per day? (!) 2-3   How many sweetened beverages each day? 0-1            7/16/2024   Exercise   Days per week of moderate/strenous exercise 3 days   Average minutes spent exercising at this level Patient declined            " 7/16/2024   Social Factors   Frequency of gathering with friends or relatives Once a week   Worry food won't last until get money to buy more No   Food not last or not have enough money for food? No   Do you have housing? (Housing is defined as stable permanent housing and does not include staying ouside in a car, in a tent, in an abandoned building, in an overnight shelter, or couch-surfing.) Yes   Are you worried about losing your housing? No   Lack of transportation? No   Unable to get utilities (heat,electricity)? No            7/16/2024   Dental   Dentist two times every year? Yes            7/16/2024   TB Screening   Were you born outside of the [place]? Yes            Today's PHQ-2 Score:       7/16/2024    10:18 AM   PHQ-2 ( 1999 [employer])   Q1: Little interest or pleasure in doing things 0   Q2: Feeling down, depressed or hopeless 0   PHQ-2 Score 0   Q1: Little interest or pleasure in doing things Not at all   Q2: Feeling down, depressed or hopeless Not at all   PHQ-2 Score 0           7/16/2024   Substance Use   Alcohol more than 3/day or more than 7/wk No   Do you use any other substances recreationally? No        Social History     Tobacco Use    Smoking status: Never     Passive exposure: Never    Smokeless tobacco: Never   Vaping Use    Vaping status: Never Used   Substance Use Topics    Alcohol use: No    Drug use: No           7/16/2024   STI Screening   New sexual partner(s) since last STI/HIV test? No            7/16/2024   Contraception/Family Planning   Questions about contraception or family planning (!) DECLINE           Reviewed and updated as needed this visit by Provider                             Objective    Exam  /76   Pulse 69   Temp 97.8  F (36.6  C) (Tympanic)   Resp 18   Ht 1.596 m (5' 2.84\")   Wt 66.7 kg (147 lb 1.6 oz)   SpO2 96%   BMI 26.19 kg/m     Estimated body mass index is 26.19 kg/m  as calculated from the following:    Height as of this encounter: 1.596 m (5' " "2.84\").    Weight as of this encounter: 66.7 kg (147 lb 1.6 oz).    Physical Exam  GENERAL: alert and no distress  EYES: Eyes grossly normal to inspection, PERRL and conjunctivae and sclerae normal  HENT: ear canals and TM's normal, nose and mouth without ulcers or lesions  NECK: no adenopathy, no asymmetry, masses, or scars  RESP: lungs clear to auscultation - no rales, rhonchi or wheezes  CV: regular rate and rhythm, normal S1 S2, no S3 or S4, no murmur, click or rub, no peripheral edema  ABDOMEN: soft, nontender, no hepatosplenomegaly, no masses and bowel sounds normal  MS: no gross musculoskeletal defects noted, no edema  SKIN: no suspicious lesions or rashes  NEURO: Normal strength and tone, mentation intact and speech normal  PSYCH: mentation appears normal, affect normal/bright        Signed Electronically by: Rita Benavides MD    "

## 2024-07-16 NOTE — PATIENT INSTRUCTIONS
Concerta: Please make sure you taking this around 6am. This needs to be at the same time every day.    Sertraline: try taking sertraline in the morning with the Concerta.         Patient Education   Preventive Care Advice   This is general advice given by our system to help you stay healthy. However, your care team may have specific advice just for you. Please talk to your care team about your preventive care needs.  Nutrition  Eat 5 or more servings of fruits and vegetables each day.  Try wheat bread, brown rice and whole grain pasta (instead of white bread, rice, and pasta).  Get enough calcium and vitamin D. Check the label on foods and aim for 100% of the RDA (recommended daily allowance).  Lifestyle  Exercise at least 150 minutes each week  (30 minutes a day, 5 days a week).  Do muscle strengthening activities 2 days a week. These help control your weight and prevent disease.  No smoking.  Wear sunscreen to prevent skin cancer.  Have a dental exam and cleaning every 6 months.  Yearly exams  See your health care team every year to talk about:  Any changes in your health.  Any medicines your care team has prescribed.  Preventive care, family planning, and ways to prevent chronic diseases.  Shots (vaccines)   HPV shots (up to age 26), if you've never had them before.  Hepatitis B shots (up to age 59), if you've never had them before.  COVID-19 shot: Get this shot when it's due.  Flu shot: Get a flu shot every year.  Tetanus shot: Get a tetanus shot every 10 years.  Pneumococcal, hepatitis A, and RSV shots: Ask your care team if you need these based on your risk.  Shingles shot (for age 50 and up)  General health tests  Diabetes screening:  Starting at age 35, Get screened for diabetes at least every 3 years.  If you are younger than age 35, ask your care team if you should be screened for diabetes.  Cholesterol test: At age 39, start having a cholesterol test every 5 years, or more often if advised.  Bone density  scan (DEXA): At age 50, ask your care team if you should have this scan for osteoporosis (brittle bones).  Hepatitis C: Get tested at least once in your life.  STIs (sexually transmitted infections)  Before age 24: Ask your care team if you should be screened for STIs.  After age 24: Get screened for STIs if you're at risk. You are at risk for STIs (including HIV) if:  You are sexually active with more than one person.  You don't use condoms every time.  You or a partner was diagnosed with a sexually transmitted infection.  If you are at risk for HIV, ask about PrEP medicine to prevent HIV.  Get tested for HIV at least once in your life, whether you are at risk for HIV or not.  Cancer screening tests  Cervical cancer screening: If you have a cervix, begin getting regular cervical cancer screening tests starting at age 21.  Breast cancer scan (mammogram): If you've ever had breasts, begin having regular mammograms starting at age 40. This is a scan to check for breast cancer.  Colon cancer screening: It is important to start screening for colon cancer at age 45.  Have a colonoscopy test every 10 years (or more often if you're at risk) Or, ask your provider about stool tests like a FIT test every year or Cologuard test every 3 years.  To learn more about your testing options, visit:   .  For help making a decision, visit:   https://bit.ly/rj11801.  Prostate cancer screening test: If you have a prostate, ask your care team if a prostate cancer screening test (PSA) at age 55 is right for you.  Lung cancer screening: If you are a current or former smoker ages 50 to 80, ask your care team if ongoing lung cancer screenings are right for you.  For informational purposes only. Not to replace the advice of your health care provider. Copyright   2023 [employer]. All rights reserved. Clinically reviewed by the [facility] Transitions Program. [employer] 420004 - REV 01/24.

## 2024-09-08 ENCOUNTER — OFFICE VISIT (OUTPATIENT)
Dept: URGENT CARE | Facility: URGENT CARE | Age: 24
End: 2024-09-08
Payer: COMMERCIAL

## 2024-09-08 VITALS
WEIGHT: 143 LBS | OXYGEN SATURATION: 99 % | BODY MASS INDEX: 25.46 KG/M2 | RESPIRATION RATE: 18 BRPM | HEART RATE: 92 BPM | SYSTOLIC BLOOD PRESSURE: 128 MMHG | DIASTOLIC BLOOD PRESSURE: 82 MMHG | TEMPERATURE: 97.8 F

## 2024-09-08 DIAGNOSIS — J34.89 STUFFY AND RUNNY NOSE: ICD-10-CM

## 2024-09-08 DIAGNOSIS — J06.9 VIRAL URI WITH COUGH: Primary | ICD-10-CM

## 2024-09-08 DIAGNOSIS — R05.1 ACUTE COUGH: ICD-10-CM

## 2024-09-08 DIAGNOSIS — J02.9 SORE THROAT: ICD-10-CM

## 2024-09-08 DIAGNOSIS — R52 BODY ACHES: ICD-10-CM

## 2024-09-08 DIAGNOSIS — R68.83 CHILLS: ICD-10-CM

## 2024-09-08 LAB
DEPRECATED S PYO AG THROAT QL EIA: NEGATIVE
FLUAV AG SPEC QL IA: NEGATIVE
FLUBV AG SPEC QL IA: NEGATIVE

## 2024-09-08 PROCEDURE — 87635 SARS-COV-2 COVID-19 AMP PRB: CPT | Performed by: PHYSICIAN ASSISTANT

## 2024-09-08 PROCEDURE — 87651 STREP A DNA AMP PROBE: CPT | Performed by: PHYSICIAN ASSISTANT

## 2024-09-08 PROCEDURE — 99214 OFFICE O/P EST MOD 30 MIN: CPT | Performed by: PHYSICIAN ASSISTANT

## 2024-09-08 PROCEDURE — 87804 INFLUENZA ASSAY W/OPTIC: CPT | Performed by: PHYSICIAN ASSISTANT

## 2024-09-08 NOTE — PATIENT INSTRUCTIONS
September 8, 2024 Urgent Care Plan      -Home supportive care and observant management   -Ok to alternate over the counter doses of Ibuprofen 400 mg and Tylenol 650 mg  (switch from one to the other every 4 hours) for the next couple of days, as needed for sore throat and fever  -Encourage extra fluids   -Follow-up with your primary care team if you are not all better in the next 7-10 days, or sooner if any worsening or new illness symptoms.     -Watch your MyChart for your second Strep Test result (called Strep PCR).  The second test result typically comes back between 4 hours and 24 hours.  If your second test shows Strep Throat, we will contact you and start you on an antibiotic.     -Watch your MyChart for your pending COVID PCR test result (this typically takes 24 to 36 hours).

## 2024-09-08 NOTE — LETTER
September 8, 2024      Chino Jesus  [address] [facility] [address]        To Whom It May Concern:    Chino Jesus was seen here today for evaluation of an acute medical problem. Please excuse him from missed work until he is without fever for 24 hours, until his symptoms are improving, and until the results of his pending lab tests are available.       Sincerely,        Samuel Giraldo PA-C

## 2024-09-08 NOTE — PROGRESS NOTES
ASSESSMENT/PLAN:       (J06.9) Viral URI with cough  (primary encounter diagnosis)    MDM: Acute onset upper respiratory symptoms consistent with viral URI-with systemic symptoms of body aches and chills. Influenza testing is negative. Rapid Strep is negative. Strep and Covid PCR test results pending. No evidence of secondary bacterial infection on exam. No evidence of respiratory distress or other medical distress requiring emergent intervention at this time. Note requested for missed work tomorrow is provided.       AVS-Plan:       September 8, 2024 Urgent Care Plan      -Home supportive care and observant management   -Ok to alternate over the counter doses of Ibuprofen 400 mg and Tylenol 650 mg  (switch from one to the other every 4 hours) for the next couple of days, as needed for sore throat and fever  -Encourage extra fluids   -Follow-up with your primary care team if you are not all better in the next 7-10 days, or sooner if any worsening or new illness symptoms.     -Watch your MyChart for your second Strep Test result (called Strep PCR).  The second test result typically comes back between 4 hours and 24 hours.  If your second test shows Strep Throat, we will contact you and start you on an antibiotic.     -Watch your MyChart for your pending COVID PCR test result (this typically takes 24 to 36 hours).          (J02.9) Sore throat  Plan: Streptococcus A Rapid Screen w/Reflex to PCR,         Group A Streptococcus PCR Throat Swab       (R52) Body aches  Plan: Influenza A & B Antigen - Clinic Collect,         Symptomatic COVID-19 Virus (Coronavirus) by PCR        Nose      (J34.89) Stuffy and runny nose      (R05.1) Acute cough      (R68.83) Chills        This progress note has been dictated, with use of voice recognition software. Any grammatical, typographical, or context errors are unintentional and inherent to use of voice recognition software.  -------------------------      Chief Complaint   Patient  presents with    Cold Symptoms     C/o sore throat, body aches/chills, not sleeping/fatigue, congestion, cough x4 days         SUBJECTIVE:     Chino Jesus is a 23 y.o male who presents to urgent care today, accompanied by his medical guardian, for evaluation of acute onset chills, cough, stuffy/runny nose, waxing and waning generalized body aches and sore throat x 4 days duration. Patient confirms they are still able to take in good fluids and soft food despite sore throat.      Illness Contact: No household illness exposure      Home Covid testing reported negative (one test was )         RESPIRATORY HISTORY No prior history of hospitalization for respiratory distress. No known asthma, reactive disease or other respiratory history.        ROS:   CONSITUTIONAL: Positive for chills-no elevated temperature at home. Positive malaise. No severe fatigue  HEENT: Positive sore throat as per above HPI. No difficulty talking, swallowing, opening mouth or breathing upon questioning today.   Positive nasal congestion No other ENT sxs.   RESP: Positive cough. No wheezing or shortness of breath. No coughing up of blood.  GI: No acute vomiting or diarrhea. No abdominal pain.   SKIN: No acute rash or hives    NEURO: No severe headaches, neck stiffness, photophobia, rash, mental status changes or lethargy.   RHEUM: Positive for waxing and waning generalized body aches. No red, warm or swollen joints     Past Medical History:   Diagnosis Date    ADHD (attention deficit hyperactivity disorder)     Anxiety     OCD (obsessive compulsive disorder)     PDD (pervasive developmental disorder)     Short stature        Patient Active Problem List   Diagnosis    Anxiety    ADHD (attention deficit hyperactivity disorder)    Obsessive-compulsive disorder, unspecified type    Active autistic disorder    Mood disorder (H24)    Other ulcerative colitis without complication (H)    Vitamin D deficiency    Tinnitus, bilateral    Poor  sleep       Current Outpatient Medications   Medication Sig Dispense Refill    cholestyramine (QUESTRAN) 4 g packet as needed      fluticasone (FLONASE) 50 MCG/ACT nasal spray Spray 2 sprays into both nostrils daily 16 g 3    melatonin 3 MG tablet Take 10 mg by mouth nightly as needed for sleep (patient takes two tablets)      methylphenidate HCl ER, OSM, (CONCERTA) 54 MG CR tablet Take 1 tablet (54 mg) by mouth daily for 30 days 30 tablet 0    [START ON 9/30/2024] methylphenidate HCl ER, OSM, (CONCERTA) 54 MG CR tablet Take 1 tablet (54 mg) by mouth daily for 30 days 30 tablet 0    [START ON 11/1/2024] methylphenidate HCl ER, OSM, (CONCERTA) 54 MG CR tablet Take 1 tablet (54 mg) by mouth daily for 30 days 30 tablet 0    methylphenidate HCl ER, OSM, (CONCERTA) 54 MG CR tablet Take 1 tablet (54 mg) by mouth daily for 30 days 30 tablet 0    sertraline (ZOLOFT) 100 MG tablet Take 2 tablets (200 mg) by mouth daily 180 tablet 1     No current facility-administered medications for this visit.       Allergies   Allergen Reactions    Hornets          OBJECTIVE:  /82 (BP Location: Right arm, Patient Position: Sitting, Cuff Size: Adult Regular)   Pulse 92   Temp 97.8  F (36.6  C) (Temporal)   Resp 18   Wt 64.9 kg (143 lb)   SpO2 99%   BMI 25.46 kg/m      No fever reducing medication for approximately 9-10 hours        General appearance: alert and no apparent distress  Skin color is uniform in color and without rash.  HEENT:   Conjunctiva not injected.  Sclera clear.  Left TM is normal: no effusions, no erythema, and normal landmarks.  Right TM is normal: no effusions, no erythema, and normal landmarks.  Nasal mucosa is congested  Oropharyngeal exam is positive for mild, generalized, posterior pharyngeal erythema.  No asymmetry. Uvula is midline. No trismus. Voice is clear. No lesions, adenopathy, plaque or exudate.  Neck is supple, FROM. No neck stiffness. No adenopathy  CARDIAC:NORMAL - regular rate and rhythm  without murmur.  RESP: No increased work of breathing. No retractions. No stridor. Lung fields are clear to ausculation. No rales, rhonchi, or wheezing.  NEURO: Alert and oriented.  Normal speech and mentation.  CN II/XII grossly intact.  Gait within normal limits.          LAB:      Results for orders placed or performed in visit on 09/08/24   Streptococcus A Rapid Screen w/Reflex to PCR     Status: Normal    Specimen: Throat; Swab   Result Value Ref Range    Group A Strep antigen Negative Negative   Influenza A & B Antigen - Clinic Collect     Status: Normal    Specimen: Nose; Swab   Result Value Ref Range    Influenza A antigen Negative Negative    Influenza B antigen Negative Negative    Narrative    Test results must be correlated with clinical data. If necessary, results should be confirmed by a molecular assay or viral culture.       Covid PCR test result is pending       intentional and inherent to use of voice recognition software.  -------------------

## 2024-09-09 LAB
GROUP A STREP BY PCR: NOT DETECTED
SARS-COV-2 RNA RESP QL NAA+PROBE: NEGATIVE

## 2024-09-17 ENCOUNTER — OFFICE VISIT (OUTPATIENT)
Dept: URGENT CARE | Facility: URGENT CARE | Age: 24
End: 2024-09-17
Payer: COMMERCIAL

## 2024-09-17 VITALS
HEART RATE: 82 BPM | RESPIRATION RATE: 18 BRPM | DIASTOLIC BLOOD PRESSURE: 80 MMHG | OXYGEN SATURATION: 98 % | SYSTOLIC BLOOD PRESSURE: 130 MMHG | TEMPERATURE: 98.6 F

## 2024-09-17 DIAGNOSIS — H61.22 IMPACTED CERUMEN OF LEFT EAR: ICD-10-CM

## 2024-09-17 DIAGNOSIS — J01.90 ACUTE SINUSITIS WITH SYMPTOMS > 10 DAYS: Primary | ICD-10-CM

## 2024-09-17 PROCEDURE — 99213 OFFICE O/P EST LOW 20 MIN: CPT | Performed by: PHYSICIAN ASSISTANT

## 2024-09-17 NOTE — PATIENT INSTRUCTIONS
September 17, 2024 Urgent Care Plan:     Start the antibiotic I prescribed today (Augmentin--one tab, twice daily, for 10 days)   Follow-up with your primary care provider team if you do not have any improvement in the next 5 days, if you are not all better in 10 days, and sooner if worsening)  Please also see the Sinusitis educational handout provided in your MyChart   As we reviewed today, do not take more than 3,000 mg of Tylenol in 24 hours   Try over-the-counter ear wax softening drops for wax in left ear. Contact your primary care provide team to see if they can assist you in getting an ear flush appointment after several days useof wax softening drops.

## 2024-09-17 NOTE — PROGRESS NOTES
ASSESSMENT/PLAN:    (J01.90) Acute sinusitis with symptoms > 10 days  (primary encounter diagnosis)  MDM:  Viral URI with secondary bacterial sinusitis.   Plan: amoxicillin-clavulanate (AUGMENTIN) 875-125 MG         tablet                September 17, 2024 Urgent Care Plan:     Start the antibiotic I prescribed today (Augmentin--one tab, twice daily, for 10 days)   Follow-up with your primary care provider team if you do not have any improvement in the next 5 days, if you are not all better in 10 days, and sooner if worsening)  Please also see the Sinusitis educational handout provided in your MyChart   As we reviewed today, do not take more than 3,000 mg of Tylenol in 24 hours   Try over-the-counter ear wax softening drops for wax in left ear. Contact your primary care provide team to see if they can assist you in getting an ear flush appointment after several days useof wax softening drops.       (H61.22) Impacted cerumen of left ear    This progress note has been dictated, with use of voice recognition software. Any grammatical, typographical, or context errors are unintentional and inherent to use of voice recognition software.  ------------------    Chief Complaint   Patient presents with    Otalgia     4 days, left is worse than right         SUBJECTIVE:   Chino Jesus presents to clinic today, accompanied by his mother, for evaluation of persistent, progressive, nasal/sinus congestion, purulent rhinorrhea, waxing and waning sinus distribution headache and intermittent cough x approximately 10-14 days duration. Patient also reports bilateral ear discomfort waxing and waning (L>R). No bloody or purulent ear drainage.     Of note: he was seen here by me on 9/8/24 for viral URI ( negative Strep, Covid and influenza testing at last visit)             ROS: Positive as per above. No associated fever,  shortness of breath, wheezing,  nausea, vomiting, diarrhea, body aches,rashes, joint swelling or other  acute illness symptoms.       Past Medical History:   Diagnosis Date    ADHD (attention deficit hyperactivity disorder)     Anxiety     OCD (obsessive compulsive disorder)     PDD (pervasive developmental disorder)     Short stature        Patient Active Problem List   Diagnosis    Anxiety    ADHD (attention deficit hyperactivity disorder)    Obsessive-compulsive disorder, unspecified type    Active autistic disorder    Mood disorder (H24)    Other ulcerative colitis without complication (H)    Vitamin D deficiency    Tinnitus, bilateral    Poor sleep       Current Outpatient Medications   Medication Sig Dispense Refill    cholestyramine (QUESTRAN) 4 g packet as needed      fluticasone (FLONASE) 50 MCG/ACT nasal spray Spray 2 sprays into both nostrils daily 16 g 3    melatonin 3 MG tablet Take 10 mg by mouth nightly as needed for sleep (patient takes two tablets)      methylphenidate HCl ER, OSM, (CONCERTA) 54 MG CR tablet Take 1 tablet (54 mg) by mouth daily for 30 days 30 tablet 0    [START ON 9/30/2024] methylphenidate HCl ER, OSM, (CONCERTA) 54 MG CR tablet Take 1 tablet (54 mg) by mouth daily for 30 days 30 tablet 0    [START ON 11/1/2024] methylphenidate HCl ER, OSM, (CONCERTA) 54 MG CR tablet Take 1 tablet (54 mg) by mouth daily for 30 days 30 tablet 0    sertraline (ZOLOFT) 100 MG tablet Take 2 tablets (200 mg) by mouth daily 180 tablet 1    methylphenidate HCl ER, OSM, (CONCERTA) 54 MG CR tablet Take 1 tablet (54 mg) by mouth daily for 30 days 30 tablet 0     No current facility-administered medications for this visit.       Allergies   Allergen Reactions    Hornets             OBJECTIVE:   /80   Pulse 82   Temp 98.6  F (37  C)   Resp 18   SpO2 98%       General appearance: alert and no apparent distress   Skin color is uniform in color and without rash.   HEENT:   Conjunctiva not injected. Sclera clear.   Left External canal is occluded with cerumen. MA attempted, unsuccessfully, to flush ear. I  attempted to remove cerumen with curette, unsuccessfully.   Right TM is normal: no effusions, no erythema, and normal landmarks.   Nasal mucosa is red and swollen. Maxillary sinuses are tender to fingertip percussion bilaterally.   Oropharyngeal exam is normal other than evidence of post-nasal drip: no lesions, erythema, adenopathy or exudate. Uvula is midline. No trismus.  Neck is supple, full range of motion. No adenopathy. No lateral neck swelling.   CARDIAC:NORMAL - regular rate and rhythm without murmur.   RESP: Normal - CTA without rales, rhonchi, or wheezing.   NEURO: Alert and oriented.  Normal speech and mentation.  CN II/XII grossly intact.  Gait within normal limits.

## 2024-10-21 ENCOUNTER — E-VISIT (OUTPATIENT)
Dept: PEDIATRICS | Facility: CLINIC | Age: 24
End: 2024-10-21
Payer: COMMERCIAL

## 2024-10-21 ENCOUNTER — MYC MEDICAL ADVICE (OUTPATIENT)
Dept: PEDIATRICS | Facility: CLINIC | Age: 24
End: 2024-10-21
Payer: COMMERCIAL

## 2024-10-21 DIAGNOSIS — A69.20 LYME DISEASE: Primary | ICD-10-CM

## 2024-10-21 PROCEDURE — 99421 OL DIG E/M SVC 5-10 MIN: CPT | Performed by: PEDIATRICS

## 2024-10-21 RX ORDER — DOXYCYCLINE HYCLATE 100 MG
100 TABLET ORAL 2 TIMES DAILY
Qty: 20 TABLET | Refills: 0 | Status: SHIPPED | OUTPATIENT
Start: 2024-10-21 | End: 2024-10-31

## 2024-12-10 ENCOUNTER — MYC REFILL (OUTPATIENT)
Dept: PEDIATRICS | Facility: CLINIC | Age: 24
End: 2024-12-10
Payer: COMMERCIAL

## 2024-12-10 DIAGNOSIS — F90.2 ATTENTION DEFICIT HYPERACTIVITY DISORDER (ADHD), COMBINED TYPE: ICD-10-CM

## 2024-12-10 RX ORDER — METHYLPHENIDATE HYDROCHLORIDE 54 MG/1
54 TABLET ORAL DAILY
Qty: 30 TABLET | Refills: 0 | Status: CANCELLED | OUTPATIENT
Start: 2024-12-10

## 2024-12-11 RX ORDER — METHYLPHENIDATE HYDROCHLORIDE 54 MG/1
54 TABLET ORAL DAILY
Qty: 30 TABLET | Refills: 0 | Status: SHIPPED | OUTPATIENT
Start: 2025-01-10 | End: 2025-02-09

## 2024-12-11 RX ORDER — METHYLPHENIDATE HYDROCHLORIDE 54 MG/1
54 TABLET ORAL DAILY
Qty: 30 TABLET | Refills: 0 | Status: SHIPPED | OUTPATIENT
Start: 2024-12-11 | End: 2025-01-10

## 2024-12-11 RX ORDER — METHYLPHENIDATE HYDROCHLORIDE 54 MG/1
54 TABLET ORAL DAILY
Qty: 30 TABLET | Refills: 0 | Status: SHIPPED | OUTPATIENT
Start: 2025-02-09 | End: 2025-03-11

## 2025-01-21 ENCOUNTER — HOSPITAL ENCOUNTER (EMERGENCY)
Facility: CLINIC | Age: 25
Discharge: HOME | End: 2025-01-21
Attending: PHYSICIAN ASSISTANT | Admitting: PHYSICIAN ASSISTANT
Payer: COMMERCIAL

## 2025-01-21 VITALS
SYSTOLIC BLOOD PRESSURE: 119 MMHG | TEMPERATURE: 98.2 F | RESPIRATION RATE: 16 BRPM | WEIGHT: 140 LBS | HEART RATE: 90 BPM | BODY MASS INDEX: 24.8 KG/M2 | OXYGEN SATURATION: 98 % | HEIGHT: 63 IN | DIASTOLIC BLOOD PRESSURE: 78 MMHG
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
## 2025-01-21 DIAGNOSIS — S61.451A DOG BITE OF RIGHT HAND, INITIAL ENCOUNTER: ICD-10-CM

## 2025-01-21 DIAGNOSIS — S61.214A LACERATION OF RIGHT RING FINGER WITHOUT FOREIGN BODY WITHOUT DAMAGE TO NAIL, INITIAL ENCOUNTER: ICD-10-CM

## 2025-01-21 PROCEDURE — 250N000013 HC RX MED GY IP 250 OP 250 PS 637: Performed by: PHYSICIAN ASSISTANT

## 2025-01-21 PROCEDURE — 12001 RPR S/N/AX/GEN/TRNK 2.5CM/<: CPT | Mod: F8

## 2025-01-21 PROCEDURE — 99283 EMERGENCY DEPT VISIT LOW MDM: CPT

## 2025-01-21 RX ADMIN — AMOXICILLIN AND CLAVULANATE POTASSIUM 1 TABLET: 875; 125 TABLET ORAL at 22:46

## 2025-01-21 ASSESSMENT — ACTIVITIES OF DAILY LIVING (ADL): ADLS_ACUITY_SCORE: 41

## 2025-01-22 NOTE — ED TRIAGE NOTES
Patient reports being bit on the right hand by his dog. Dog is up to date on vaccines. Wound to right palm and right ring finger.      Triage Assessment (Adult)       Row Name 01/21/25 0281          Triage Assessment    Airway WDL WDL        Respiratory WDL    Respiratory WDL WDL        Skin Circulation/Temperature WDL    Skin Circulation/Temperature WDL X        Peripheral/Neurovascular WDL    Peripheral Neurovascular WDL WDL        Cognitive/Neuro/Behavioral WDL    Cognitive/Neuro/Behavioral WDL WDL

## 2025-01-22 NOTE — DISCHARGE INSTRUCTIONS
The laceration on your right fourth finger was closed with two sutures that will need to be removed in 12-14 days. Schedule wound check with primary care. Contact information provided for local orthopedic group for any persistent numbness or hand weakness. Please take oral antibiotic as prescribed. Return to ED with any new or worsening symptoms including signs of infection.

## 2025-01-22 NOTE — ED PROVIDER NOTES
"  Emergency Department Note      History of Present Illness     Chief Complaint   Dog Bite      HPI   Chino Jesus is a 24 year old male who presents with dog bite. Patient states he was bitten by his own dog earlier this evening on his right hand. He has two punctures wounds near his right thumb and a laceration to the fourth digit of right hand. He is able to move hand without difficulty. He reports some numbness in right index finger however this resolved. He is not concerned for retained teeth. Dog is up to date with vaccinations including tetanus. Patient's tetanus from 2019.    Independent Historian   None    Review of External Notes   TD 2019    Past Medical History     Medical History and Problem List   Past Medical History:   Diagnosis Date    ADHD (attention deficit hyperactivity disorder)     Anxiety     OCD (obsessive compulsive disorder)     PDD (pervasive developmental disorder)     Short stature        Medications   amoxicillin-clavulanate (AUGMENTIN) 875-125 MG tablet  cholestyramine (QUESTRAN) 4 g packet  fluticasone (FLONASE) 50 MCG/ACT nasal spray  melatonin 3 MG tablet  methylphenidate HCl ER, OSM, (CONCERTA) 54 MG CR tablet  methylphenidate HCl ER, OSM, (CONCERTA) 54 MG CR tablet  [START ON 2/9/2025] methylphenidate HCl ER, OSM, (CONCERTA) 54 MG CR tablet  methylphenidate HCl ER, OSM, (CONCERTA) 54 MG CR tablet  methylphenidate HCl ER, OSM, (CONCERTA) 54 MG CR tablet  methylphenidate HCl ER, OSM, (CONCERTA) 54 MG CR tablet  methylphenidate HCl ER, OSM, (CONCERTA) 54 MG CR tablet  sertraline (ZOLOFT) 100 MG tablet        Surgical History   Past Surgical History:   Procedure Laterality Date    CIRCUMCISION N/A 8/5/2015    Procedure: CIRCUMCISION;  Surgeon: Chapin Guzman MD;  Location:  OR       Physical Exam     Patient Vitals for the past 24 hrs:   BP Temp Temp src Pulse Resp SpO2 Height Weight   01/21/25 2206 119/78 98.2  F (36.8  C) Oral 90 16 98 % 1.6 m (5' 3\") 63.5 kg (140 " lb)     Physical Exam  Constitutional: Alert, attentive, GCS 15  HENT:    Nose: Nose normal.    Mouth/Throat: Oropharynx is clear, mucous membranes are moist   Eyes: EOM are normal. Pupils equal and reactive.  Neck: Normal range of motion. No rigidity.  CV: regular rate and rhythm; no murmurs, rubs or gallups  Chest: Effort normal and breath sounds normal.   MSK: Normal range of motion of right hand and fingers. Two puncture wounds to skin between thumb and first digit. No foreign bodies. Deep laceration to posterior fourth digit just proximal to PIP joint. No tendon or bony injuries. No foreign bodies. Normal sensation in right hand and fingers.   Neurological: Alert, attentive  Skin: Skin is warm and dry.      Diagnostics     Lab Results   Labs Ordered and Resulted from Time of ED Arrival to Time of ED Departure - No data to display    Imaging   No orders to display       EKG   None    Independent Interpretation   None    ED Course      Medications Administered   Medications   amoxicillin-clavulanate (AUGMENTIN) 875-125 MG per tablet 1 tablet (1 tablet Oral $Given 1/21/25 5513)       Procedures   Procedures     Laceration Repair      Procedure: Laceration Repair    Indication: Laceration    Consent: Verbal    Tetanus status reviewed and is current    Location: Right R fourth (ring) finger    Length: 1 cm    Preparation: Irrigation with Sterile Saline.    Anesthesia/Sedation: Bupivacaine - 0.5%      Treatment/Exploration: Wound explored, no foreign bodies found     Closure: The wound was loosely closed with one layer. Skin/superficial layer was closed with 2 x 5-0 Nylon using Interrupted sutures.     Patient Status: The patient tolerated the procedure well: Yes. There were no complications.      Discussion of Management   None    ED Course        Additional Documentation  None    Medical Decision Making / Diagnosis     CMS Diagnoses: None    [facility]       None    [facility]   Chino Jesus is a 24 year old male who  presents for evaluation of dog bite to right hand. Dog is patient's own pet and is current with vaccinations including rabies. Rabies prophylaxis not indicated. Vitals are normal. Physical exam reveals two puncture wounds to skin between right thumb and index finger and deeper laceration to dorsal right fourth digit just proximal to PIP joint. No evidence of tendon or bony injuries. No retained foreign bodies such as teeth. Right hand remains neurovascularly intact. Wounds were anesthetized and evaluation at bedside. Deep laceration to fourth digit was closed with two sutures due to depth and gaping appearance of wound. Given risk for infection, puncture wounds to skin near right thumb will remain open. He was given first dose of oral antibiotics and will be sent home with prescription. Tetanus is current. Laceration cares for home discussed with patient including return precautions such as signs of infection. Questions answered. He may follow-up with primary care for suture removal and contact information provided for orthopedic group for any persistent numbness or weakness. Patient and family comfortable with plan and he is discharged home.    Disposition   The patient was discharged.     Diagnosis     ICD-10-CM    1. Dog bite of right hand, initial encounter  S61.451A     W54.0XXA       2. Laceration of right ring finger without foreign body without damage to nail, initial encounter  S61.214A            Discharge Medications   New Prescriptions    AMOXICILLIN-CLAVULANATE (AUGMENTIN) 875-125 MG TABLET    Take 1 tablet by mouth 2 times daily for 7 days.         10:36 PM  January 21, 2025  MAYKEL TOMLIN Emily, PA-C  01/21/25 4409

## 2025-01-30 ENCOUNTER — VIRTUAL VISIT (OUTPATIENT)
Dept: PEDIATRICS | Facility: CLINIC | Age: 25
End: 2025-01-30
Attending: PEDIATRICS
Payer: COMMERCIAL

## 2025-01-30 DIAGNOSIS — F39 MOOD DISORDER: ICD-10-CM

## 2025-01-30 DIAGNOSIS — R53.83 FATIGUE, UNSPECIFIED TYPE: ICD-10-CM

## 2025-01-30 DIAGNOSIS — F90.2 ATTENTION DEFICIT HYPERACTIVITY DISORDER (ADHD), COMBINED TYPE: Primary | ICD-10-CM

## 2025-01-30 DIAGNOSIS — F41.9 ANXIETY: ICD-10-CM

## 2025-01-30 DIAGNOSIS — K51.80 OTHER ULCERATIVE COLITIS WITHOUT COMPLICATION (H): ICD-10-CM

## 2025-01-30 RX ORDER — SERTRALINE HYDROCHLORIDE 100 MG/1
200 TABLET, FILM COATED ORAL DAILY
Qty: 180 TABLET | Refills: 1 | Status: SHIPPED | OUTPATIENT
Start: 2025-01-30

## 2025-01-30 RX ORDER — HYOSCYAMINE SULFATE 0.38 MG/1
1 TABLET, EXTENDED RELEASE ORAL
COMMUNITY
Start: 2024-04-02

## 2025-01-30 NOTE — Clinical Note
Luis A Tamayo - I think Chino would benefit from some short term therapy to review coping techniques for anxiety.  Thanks!  Rita Benavides
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
## 2025-02-05 ENCOUNTER — LAB (OUTPATIENT)
Dept: LAB | Facility: CLINIC | Age: 25
End: 2025-02-05
Payer: COMMERCIAL

## 2025-02-05 ENCOUNTER — ALLIED HEALTH/NURSE VISIT (OUTPATIENT)
Dept: PEDIATRICS | Facility: CLINIC | Age: 25
End: 2025-02-05
Payer: COMMERCIAL

## 2025-02-05 DIAGNOSIS — F39 MOOD DISORDER: ICD-10-CM

## 2025-02-05 DIAGNOSIS — F41.9 ANXIETY: ICD-10-CM

## 2025-02-05 DIAGNOSIS — R53.83 FATIGUE, UNSPECIFIED TYPE: ICD-10-CM

## 2025-02-05 DIAGNOSIS — Z48.02 ENCOUNTER FOR REMOVAL OF SUTURES: Primary | ICD-10-CM

## 2025-02-05 LAB
ERYTHROCYTE [DISTWIDTH] IN BLOOD BY AUTOMATED COUNT: 12.6 % (ref 10–15)
FERRITIN SERPL-MCNC: 184 NG/ML (ref 31–409)
HCT VFR BLD AUTO: 40.2 % (ref 40–53)
HGB BLD-MCNC: 13.3 G/DL (ref 13.3–17.7)
MCH RBC QN AUTO: 27.1 PG (ref 26.5–33)
MCHC RBC AUTO-ENTMCNC: 33.1 G/DL (ref 31.5–36.5)
MCV RBC AUTO: 82 FL (ref 78–100)
PLATELET # BLD AUTO: 270 10E3/UL (ref 150–450)
RBC # BLD AUTO: 4.9 10E6/UL (ref 4.4–5.9)
TSH SERPL DL<=0.005 MIU/L-ACNC: 2.44 UIU/ML (ref 0.3–4.2)
VIT B12 SERPL-MCNC: 749 PG/ML (ref 232–1245)
VIT D+METAB SERPL-MCNC: 27 NG/ML (ref 20–50)
WBC # BLD AUTO: 6.3 10E3/UL (ref 4–11)

## 2025-02-05 PROCEDURE — 85027 COMPLETE CBC AUTOMATED: CPT

## 2025-02-05 PROCEDURE — 84443 ASSAY THYROID STIM HORMONE: CPT

## 2025-02-05 PROCEDURE — 82728 ASSAY OF FERRITIN: CPT

## 2025-02-05 PROCEDURE — 82306 VITAMIN D 25 HYDROXY: CPT

## 2025-02-05 PROCEDURE — 82607 VITAMIN B-12: CPT

## 2025-02-05 PROCEDURE — 99207 PR NO CHARGE NURSE ONLY: CPT

## 2025-02-05 PROCEDURE — 36415 COLL VENOUS BLD VENIPUNCTURE: CPT

## 2025-02-05 NOTE — PROGRESS NOTES
Chinodonell Gayleamber presents to the clinic for removal of sutures. The patient has had sutures in place for 15 days. There has been no patient reported signs or symptoms of infection or drainage. 2  sutures are seen and located on the Right Fourth Finger. Tetanus status is up to date. All sutures were easily removed today. Routine wound care discussed by the RN or provider. The patient will follow up as needed.  Brianna MENDIOLA RN, BSN  Clinic RN  [facility]

## 2025-03-13 ENCOUNTER — MYC MEDICAL ADVICE (OUTPATIENT)
Dept: PEDIATRICS | Facility: CLINIC | Age: 25
End: 2025-03-13
Payer: COMMERCIAL

## 2025-03-13 DIAGNOSIS — F90.2 ATTENTION DEFICIT HYPERACTIVITY DISORDER (ADHD), COMBINED TYPE: ICD-10-CM

## 2025-03-13 RX ORDER — METHYLPHENIDATE HYDROCHLORIDE 54 MG/1
54 TABLET ORAL DAILY
Qty: 30 TABLET | Refills: 0 | Status: SHIPPED | OUTPATIENT
Start: 2025-03-13

## 2025-03-13 NOTE — TELEPHONE ENCOUNTER
Patient's mother requesting refill of Concerta.  Please review and advise.    Adriane Mondragon RN

## 2025-04-15 ENCOUNTER — MYC REFILL (OUTPATIENT)
Dept: PEDIATRICS | Facility: CLINIC | Age: 25
End: 2025-04-15
Payer: COMMERCIAL

## 2025-04-15 DIAGNOSIS — F90.2 ATTENTION DEFICIT HYPERACTIVITY DISORDER (ADHD), COMBINED TYPE: ICD-10-CM

## 2025-04-15 RX ORDER — METHYLPHENIDATE HYDROCHLORIDE 54 MG/1
54 TABLET ORAL DAILY
Qty: 30 TABLET | Refills: 0 | Status: CANCELLED | OUTPATIENT
Start: 2025-04-15

## 2025-04-16 RX ORDER — METHYLPHENIDATE HYDROCHLORIDE 54 MG/1
54 TABLET ORAL DAILY
Qty: 30 TABLET | Refills: 0 | Status: SHIPPED | OUTPATIENT
Start: 2025-05-16 | End: 2025-06-15

## 2025-04-16 RX ORDER — METHYLPHENIDATE HYDROCHLORIDE 54 MG/1
54 TABLET ORAL DAILY
Qty: 30 TABLET | Refills: 0 | Status: SHIPPED | OUTPATIENT
Start: 2025-06-15 | End: 2025-07-15

## 2025-04-16 RX ORDER — METHYLPHENIDATE HYDROCHLORIDE 54 MG/1
54 TABLET ORAL DAILY
Qty: 30 TABLET | Refills: 0 | Status: SHIPPED | OUTPATIENT
Start: 2025-04-16 | End: 2025-05-16

## 2025-05-27 ENCOUNTER — E-VISIT (OUTPATIENT)
Dept: PEDIATRICS | Facility: CLINIC | Age: 25
End: 2025-05-27
Payer: COMMERCIAL

## 2025-05-27 DIAGNOSIS — S40.869A TICK BITE OF AXILLARY REGION, UNSPECIFIED LATERALITY, INITIAL ENCOUNTER: Primary | ICD-10-CM
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
## 2025-05-27 RX ORDER — DOXYCYCLINE 100 MG/1
200 CAPSULE ORAL ONCE
Qty: 2 CAPSULE | Refills: 0 | Status: SHIPPED | OUTPATIENT
Start: 2025-05-27 | End: 2025-05-27

## 2025-05-27 NOTE — PATIENT INSTRUCTIONS
Thank you for choosing us for your care. I have placed an order for a prescription so that you can start treatment:    I reviewed the picture you sent in. I don't see the bulls eye/expanding rash, so I recommend that we give you a one time prophylactic dose of antibiotic that we do in these cases.     Please let us know if you develop a rash that looks like a target and expands bigger and bigger each day.       Orders Placed This Encounter   Medications     doxycycline hyclate (VIBRAMYCIN) 100 MG capsule     Sig: Take 2 capsules (200 mg) by mouth once for 1 dose.     Dispense:  2 capsule     Refill:  0        View your full visit summary for details by clicking on the link below. Your pharmacist will able to address any questions you may have about the medication.     If you're not feeling better within 5-7 days, please schedule an appointment.  You can schedule an appointment right here in [facility], or call [phone]  If the visit is for the same symptoms as your eVisit, we'll refund the cost of your eVisit if seen within seven days.

## 2025-07-23 SDOH — HEALTH STABILITY: PHYSICAL HEALTH: ON AVERAGE, HOW MANY MINUTES DO YOU ENGAGE IN EXERCISE AT THIS LEVEL?: 60 MIN

## 2025-07-23 SDOH — HEALTH STABILITY: PHYSICAL HEALTH: ON AVERAGE, HOW MANY DAYS PER WEEK DO YOU ENGAGE IN MODERATE TO STRENUOUS EXERCISE (LIKE A BRISK WALK)?: 5 DAYS

## 2025-07-23 ASSESSMENT — SOCIAL DETERMINANTS OF HEALTH (SDOH): HOW OFTEN DO YOU GET TOGETHER WITH FRIENDS OR RELATIVES?: PATIENT DECLINED

## 2025-07-24 ENCOUNTER — OFFICE VISIT (OUTPATIENT)
Dept: PEDIATRICS | Facility: CLINIC | Age: 25
End: 2025-07-24
Attending: PEDIATRICS
Payer: COMMERCIAL

## 2025-07-24 VITALS
HEIGHT: 63 IN | DIASTOLIC BLOOD PRESSURE: 60 MMHG | WEIGHT: 148.44 LBS | TEMPERATURE: 98.1 F | RESPIRATION RATE: 20 BRPM | HEART RATE: 74 BPM | OXYGEN SATURATION: 98 % | BODY MASS INDEX: 26.3 KG/M2 | SYSTOLIC BLOOD PRESSURE: 120 MMHG

## 2025-07-24 DIAGNOSIS — M25.551 HIP PAIN, RIGHT: ICD-10-CM

## 2025-07-24 DIAGNOSIS — Z00.00 ROUTINE GENERAL MEDICAL EXAMINATION AT A HEALTH CARE FACILITY: Primary | ICD-10-CM

## 2025-07-24 DIAGNOSIS — K51.80 OTHER ULCERATIVE COLITIS WITHOUT COMPLICATION (H): ICD-10-CM

## 2025-07-24 DIAGNOSIS — F41.9 ANXIETY: ICD-10-CM

## 2025-07-24 DIAGNOSIS — F90.2 ATTENTION DEFICIT HYPERACTIVITY DISORDER (ADHD), COMBINED TYPE: ICD-10-CM

## 2025-07-24 DIAGNOSIS — F84.0 ACTIVE AUTISTIC DISORDER: ICD-10-CM

## 2025-07-24 DIAGNOSIS — R11.0 NAUSEA: ICD-10-CM

## 2025-07-24 RX ORDER — METHYLPHENIDATE HYDROCHLORIDE 54 MG/1
54 TABLET ORAL DAILY
Qty: 30 TABLET | Refills: 0 | Status: SHIPPED | OUTPATIENT
Start: 2025-07-24 | End: 2025-08-23

## 2025-07-24 RX ORDER — ESCITALOPRAM OXALATE 20 MG/1
20 TABLET ORAL DAILY
Qty: 90 TABLET | Refills: 0 | Status: SHIPPED | OUTPATIENT
Start: 2025-07-24

## 2025-07-24 RX ORDER — METHYLPHENIDATE HYDROCHLORIDE 18 MG/1
TABLET ORAL
COMMUNITY
End: 2025-07-24

## 2025-07-24 RX ORDER — METHYLPHENIDATE HYDROCHLORIDE 54 MG/1
54 TABLET ORAL DAILY
Qty: 30 TABLET | Refills: 0 | Status: SHIPPED | OUTPATIENT
Start: 2025-08-23 | End: 2025-09-22

## 2025-07-24 RX ORDER — METHYLPHENIDATE HYDROCHLORIDE 54 MG/1
54 TABLET ORAL DAILY
Qty: 30 TABLET | Refills: 0 | Status: SHIPPED | OUTPATIENT
Start: 2025-09-22 | End: 2025-10-22

## 2025-07-24 ASSESSMENT — PAIN SCALES - GENERAL: PAINLEVEL_OUTOF10: NO PAIN (0)

## 2025-07-24 NOTE — PATIENT INSTRUCTIONS
When you feel nausea, please start a journal:  -did you sleep well?  -are you dehydrated?  -are you feeing anxious?  -when did you last eat?    Anxiety  Stop sertraline  Start Lexapro 20mg - follow up with me 6 weeks video visit      Patient Education   Preventive Care Advice   This is general advice given by our system to help you stay healthy. However, your care team may have specific advice just for you. Please talk to your care team about your preventive care needs.  Nutrition  Eat 5 or more servings of fruits and vegetables each day.  Try wheat bread, brown rice and whole grain pasta (instead of white bread, rice, and pasta).  Get enough calcium and vitamin D. Check the label on foods and aim for 100% of the RDA (recommended daily allowance).  Lifestyle  Exercise at least 150 minutes each week  (30 minutes a day, 5 days a week).  Do muscle strengthening activities 2 days a week. These help control your weight and prevent disease.  No smoking.  Wear sunscreen to prevent skin cancer.  Have a dental exam and cleaning every 6 months.  Yearly exams  See your health care team every year to talk about:  Any changes in your health.  Any medicines your care team has prescribed.  Preventive care, family planning, and ways to prevent chronic diseases.  Shots (vaccines)   HPV shots (up to age 26), if you've never had them before.  Hepatitis B shots (up to age 59), if you've never had them before.  COVID-19 shot: Get this shot when it's due.  Flu shot: Get a flu shot every year.  Tetanus shot: Get a tetanus shot every 10 years.  Pneumococcal, hepatitis A, and RSV shots: Ask your care team if you need these based on your risk.  Shingles shot (for age 50 and up)  General health tests  Diabetes screening:  Starting at age 35, Get screened for diabetes at least every 3 years.  If you are younger than age 35, ask your care team if you should be screened for diabetes.  Cholesterol test: At age 39, start having a cholesterol test  every 5 years, or more often if advised.  Bone density scan (DEXA): At age 50, ask your care team if you should have this scan for osteoporosis (brittle bones).  Hepatitis C: Get tested at least once in your life.  STIs (sexually transmitted infections)  Before age 24: Ask your care team if you should be screened for STIs.  After age 24: Get screened for STIs if you're at risk. You are at risk for STIs (including HIV) if:  You are sexually active with more than one person.  You don't use condoms every time.  You or a partner was diagnosed with a sexually transmitted infection.  If you are at risk for HIV, ask about PrEP medicine to prevent HIV.  Get tested for HIV at least once in your life, whether you are at risk for HIV or not.  Cancer screening tests  Cervical cancer screening: If you have a cervix, begin getting regular cervical cancer screening tests starting at age 21.  Breast cancer scan (mammogram): If you've ever had breasts, begin having regular mammograms starting at age 40. This is a scan to check for breast cancer.  Colon cancer screening: It is important to start screening for colon cancer at age 45.  Have a colonoscopy test every 10 years (or more often if you're at risk) Or, ask your provider about stool tests like a FIT test every year or Cologuard test every 3 years.  To learn more about your testing options, visit:   .  For help making a decision, visit:   https://bit.ly/bm22085.  Prostate cancer screening test: If you have a prostate, ask your care team if a prostate cancer screening test (PSA) at age 55 is right for you.  Lung cancer screening: If you are a current or former smoker ages 50 to 80, ask your care team if ongoing lung cancer screenings are right for you.  For informational purposes only. Not to replace the advice of your health care provider. Copyright   2023 [employer]. All rights reserved. Clinically reviewed by the [facility]itions Program. [employer]  [id number] - REV 01/24.  Learning About Stress  What is stress?     Stress is your body's response to a hard situation. Your body can have a physical, emotional, or mental response. Stress is a fact of life for most people, and it affects everyone differently. What causes stress for you may not be stressful for someone else.  A lot of things can cause stress. You may feel stress when you go on a job interview, take a test, or run a race. This kind of short-term stress is normal and even useful. It can help you if you need to work hard or react quickly. For example, stress can help you finish an important job on time.  Long-term stress is caused by ongoing stressful situations or events. Examples of long-term stress include long-term health problems, ongoing problems at work, or conflicts in your family. Long-term stress can harm your health.  How does stress affect your health?  When you are stressed, your body responds as though you are in danger. It makes hormones that speed up your heart, make you breathe faster, and give you a burst of energy. This is called the fight-or-flight stress response. If the stress is over quickly, your body goes back to normal and no harm is done.  But if stress happens too often or lasts too long, it can have bad effects. Long-term stress can make you more likely to get sick, and it can make symptoms of some diseases worse. If you tense up when you are stressed, you may develop neck, shoulder, or low back pain. Stress is linked to high blood pressure and heart disease.  Stress also harms your emotional health. It can make you lopez, tense, or depressed. Your relationships may suffer, and you may not do well at work or school.  What can you do to manage stress?  You can try these things to help manage stress:   Do something active. Exercise or activity can help reduce stress. Walking is a great way to get started. Even everyday activities such as housecleaning or yard work can help.  Try  yoga or apollo chi. These techniques combine exercise and meditation. You may need some training at first to learn them.  Do something you enjoy. For example, listen to music or go to a movie. Practice your hobby or do volunteer work.  Meditate. This can help you relax, because you are not worrying about what happened before or what may happen in the future.  Do guided imagery. Imagine yourself in any setting that helps you feel calm. You can use online videos, books, or a teacher to guide you.  Do breathing exercises. For example:  From a standing position, bend forward from the waist with your knees slightly bent. Let your arms dangle close to the floor.  Breathe in slowly and deeply as you return to a standing position. Roll up slowly and lift your head last.  Hold your breath for just a few seconds in the standing position.  Breathe out slowly and bend forward from the waist.  Let your feelings out. Talk, laugh, cry, and express anger when you need to. Talking with supportive friends or family, a counselor, or a kade leader about your feelings is a healthy way to relieve stress. Avoid discussing your feelings with people who make you feel worse.  Write. It may help to write about things that are bothering you. This helps you find out how much stress you feel and what is causing it. When you know this, you can find better ways to cope.  What can you do to prevent stress?  You might try some of these things to help prevent stress:  Manage your time. This helps you find time to do the things you want and need to do.  Get enough sleep. Your body recovers from the stresses of the day while you are sleeping.  Get support. Your family, friends, and community can make a difference in how you experience stress.  Limit your news feed. Avoid or limit time on social media or news that may make you feel stressed.  Do something active. Exercise or activity can help reduce stress. Walking is a great way to get started.  Where  "can you learn more?  Go to https://www.[employer].net/patiented  Enter N032 in the search box to learn more about \"Learning About Stress.\"  Current as of: October 24, 2024  Content Version: 14.5 2024-2025 [employer].   Care instructions adapted under license by your healthcare professional. If you have questions about a medical condition or this instruction, always ask your healthcare professional. [employer] disclaims any warranty or liability for your use of this information.       "

## 2025-07-24 NOTE — PROGRESS NOTES
Preventive Care Visit  [facility] CRISTÓBAL Benavides MD, Internal Medicine - Pediatrics  Jul 24, 2025      Assessment & Plan     (Z00.00) Routine general medical examination at a health care facility  (primary encounter diagnosis)  Comment:   Plan:     (F90.2) Attention deficit hyperactivity disorder (ADHD), combined type  Comment: stable  Plan: methylphenidate HCl ER, OSM, (CONCERTA) 54 MG         CR tablet, methylphenidate HCl ER, OSM,         (CONCERTA) 54 MG CR tablet, methylphenidate HCl        ER, OSM, (CONCERTA) 54 MG CR tablet, Adult         Mental Health  Referral        Ok for 3 month prescription. Follow-up in 6m    (F41.9) Anxiety  Comment: uncontrolled, gradually worsening over the past few years. He has been on sertraline 200mg for many years. Has never tried anything else.   Plan: escitalopram (LEXAPRO) 20 MG tablet        Will switch to another selective serotonin reuptake inhibitor for now, follow up in 6w. They will be starting family therapy as well for listening/communication issues at home.     (K51.80) Other ulcerative colitis without complication (H)  Comment: stable  Plan: per GI    (F84.0) Active autistic disorder  Comment: stable  Plan: Adult Mental Health  Referral        As above - family therapy    (M2.961) Hip pain, right  Comment: exam unremarkable except for glut max tenderness  Plan: given stretching exercises    (R11.0) Nausea  Comment: likely 2/2 to anxiety  Plan: see PI - will start journal about how he is feeling at the time of the nausea, encourage biofeedback - will also monitor with change in anxiety medication as above    The longitudinal plan of care for the diagnosis(es)/condition(s) as documented were addressed during this visit. Due to the added complexity in care, I will continue to support Chino in the subsequent management and with ongoing continuity of care.    Patient has been advised of split billing requirements and  "indicates understanding: Yes    BMI  Estimated body mass index is 26.29 kg/m  as calculated from the following:    Height as of this encounter: 1.6 m (5' 3\").    Weight as of this encounter: 67.3 kg (148 lb 7 oz).       Counseling  Appropriate preventive services were addressed with this patient via screening, questionnaire, or discussion as appropriate for fall prevention, nutrition, physical activity, Tobacco-use cessation, social engagement, weight loss and cognition.  Checklist reviewing preventive services available has been given to the patient.  Reviewed patient's diet, addressing concerns and/or questions.   He is at risk for psychosocial distress and has been provided with information to reduce risk.   Reviewed preventive health counseling, as reflected in patient instructions        Subjective   Chino is a 24 year old, presenting for the following:  Physical        7/24/2025    10:28 AM   Additional Questions   Roomed by Di Carreon CMA   Accompanied by Self         7/24/2025   Forms   Any forms needing to be completed Yes         7/24/2025    10:28 AM   Patient Reported Additional Medications   Patient reports taking the following new medications N/A          HPI     ADHD - last visit 1/30/25: noted some fatigue  --Concerta 54 mg - some days it feel stronger than others. Wakes at 5:30am, hard to get enough sleep  Sometimes will try taking med at 3am - sometimes it makes him feel tired.     Takes all medications in AM (used to take sertraline in AM). Getting some nausea.      reviewed, last  6/20 (from 4/16/25 3 month prescription)  Anxiety - sertraline - did labs - all ok 200mg. Feels like anxiety is increasing gradually over the past few years. Sometimes feels that he is \"closed in\" or in a trance. Sometimes he feels that he isn't there.   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        7/23/2025   General Health   How would you rate your overall physical health? Good   Feel stress (tense, anxious, or unable to sleep) To some extent   (!) STRESS CONCERN      7/23/2025   Nutrition   Three or more servings of calcium each day? Yes   Diet: Regular (no restrictions)   How many servings of fruit and vegetables per day? (!) 2-3   How many sweetened beverages each day? 0-1         7/23/2025   Exercise   Days per week of moderate/strenous exercise 5 days   Average minutes spent exercising at this level 60 min         7/23/2025   Social Factors   Frequency of gathering with friends or relatives Patient declined   Worry food won't last until get money to buy more No   Food not last or not have enough money for food? No   Do you have housing? (Housing is defined as stable permanent housing and does not include staying outside in a car, in a tent, in an abandoned building, in an overnight shelter, or couch-surfing.) Yes   Are you worried about losing your housing? No   Lack of transportation? No   Unable to get utilities (heat,electricity)? No         7/23/2025   Dental   Dentist two times every year? Yes               7/23/2025   Substance Use   Alcohol more than 3/day or more than 7/wk No   Do you use any other substances recreationally? No     Social History     Tobacco Use    Smoking status: Never     Passive exposure: Never    Smokeless tobacco: Never   Vaping Use    Vaping status: Never Used   Substance Use Topics    Alcohol use: No    Drug use: No           7/23/2025   STI Screening   New sexual partner(s) since last STI/HIV test? No         7/23/2025   Contraception/Family Planning   Questions about contraception or family planning No        Reviewed and updated as needed this visit by Provider                             Objective    Exam  /60   Pulse 74   Temp 98.1  F (36.7  C) (Oral)   Resp 20   Ht 1.6 m  "(5' 3\")   Wt 67.3 kg (148 lb 7 oz)   SpO2 98%   BMI 26.29 kg/m     Estimated body mass index is 26.29 kg/m  as calculated from the following:    Height as of this encounter: 1.6 m (5' 3\").    Weight as of this encounter: 67.3 kg (148 lb 7 oz).    Physical Exam  GENERAL: alert and no distress  EYES: Eyes grossly normal to inspection, PERRL and conjunctivae and sclerae normal  HENT: ear canals and TM's normal, nose and mouth without ulcers or lesions  NECK: no adenopathy, no asymmetry, masses, or scars  RESP: lungs clear to auscultation - no rales, rhonchi or wheezes  CV: regular rate and rhythm, normal S1 S2, no S3 or S4, no murmur, click or rub, no peripheral edema  ABDOMEN: soft, nontender, no hepatosplenomegaly, no masses and bowel sounds normal  MS: FROM right hip, mild glut max tenderness, no lumbar or SI tenderness, hip flexor strength 5/5  SKIN: no suspicious lesions or rashes  NEURO: Normal strength and tone, mentation intact and speech normal  PSYCH: mentation appears normal, affect normal/bright        Signed Electronically by: Rita Benavides MD    "

## 2025-07-28 ENCOUNTER — PATIENT OUTREACH (OUTPATIENT)
Dept: CARE COORDINATION | Facility: CLINIC | Age: 25
End: 2025-07-28
Payer: COMMERCIAL

## 2025-07-30 DIAGNOSIS — F41.9 ANXIETY: Primary | ICD-10-CM

## 2025-07-30 DIAGNOSIS — F42.9 OBSESSIVE-COMPULSIVE DISORDER, UNSPECIFIED TYPE: ICD-10-CM

## 2025-07-30 RX ORDER — FLUOXETINE HYDROCHLORIDE 40 MG/1
40 CAPSULE ORAL DAILY
Qty: 90 CAPSULE | Refills: 0 | Status: SHIPPED | OUTPATIENT
Start: 2025-07-30

## 2025-09-04 ENCOUNTER — VIRTUAL VISIT (OUTPATIENT)
Dept: PEDIATRICS | Facility: CLINIC | Age: 25
End: 2025-09-04
Attending: PEDIATRICS
Payer: COMMERCIAL

## 2025-09-04 DIAGNOSIS — F42.9 OBSESSIVE-COMPULSIVE DISORDER, UNSPECIFIED TYPE: Primary | ICD-10-CM

## 2025-09-04 DIAGNOSIS — F41.9 ANXIETY: ICD-10-CM

## 2025-09-04 DIAGNOSIS — F39 MOOD DISORDER: ICD-10-CM
